# Patient Record
Sex: FEMALE | Race: WHITE | Employment: OTHER | URBAN - METROPOLITAN AREA
[De-identification: names, ages, dates, MRNs, and addresses within clinical notes are randomized per-mention and may not be internally consistent; named-entity substitution may affect disease eponyms.]

---

## 2018-07-16 ENCOUNTER — HOSPITAL ENCOUNTER (OUTPATIENT)
Dept: PREADMISSION TESTING | Age: 83
Discharge: HOME | End: 2018-07-16
Payer: MEDICARE

## 2018-07-16 DIAGNOSIS — I10 HYPERTENSION, UNSPECIFIED TYPE: Primary | ICD-10-CM

## 2019-03-15 ENCOUNTER — OFFICE VISIT (OUTPATIENT)
Dept: ORTHOPEDIC SURGERY | Age: 84
End: 2019-03-15
Payer: MEDICARE

## 2019-03-15 VITALS — BODY MASS INDEX: 27.31 KG/M2 | TEMPERATURE: 98 F | HEIGHT: 64 IN | WEIGHT: 160 LBS

## 2019-03-15 DIAGNOSIS — M17.11 PRIMARY OSTEOARTHRITIS OF RIGHT KNEE: Primary | ICD-10-CM

## 2019-03-15 PROCEDURE — 99203 OFFICE O/P NEW LOW 30 MIN: CPT | Performed by: ORTHOPAEDIC SURGERY

## 2019-03-15 RX ORDER — M-VIT,TX,IRON,MINS/CALC/FOLIC 27MG-0.4MG
1 TABLET ORAL DAILY
COMMUNITY

## 2019-03-27 ENCOUNTER — ANESTHESIA EVENT (OUTPATIENT)
Dept: OPERATING ROOM | Age: 84
DRG: 470 | End: 2019-03-27
Payer: MEDICARE

## 2019-03-27 ENCOUNTER — HOSPITAL ENCOUNTER (OUTPATIENT)
Dept: PREADMISSION TESTING | Age: 84
Discharge: HOME | End: 2019-03-27
Payer: MEDICARE

## 2019-03-27 VITALS
HEIGHT: 64 IN | BODY MASS INDEX: 27.3 KG/M2 | SYSTOLIC BLOOD PRESSURE: 128 MMHG | RESPIRATION RATE: 24 BRPM | HEART RATE: 91 BPM | WEIGHT: 159.9 LBS | DIASTOLIC BLOOD PRESSURE: 60 MMHG | OXYGEN SATURATION: 96 % | TEMPERATURE: 97.5 F

## 2019-03-27 DIAGNOSIS — Z01.818 PRE-OP TESTING: Primary | ICD-10-CM

## 2019-03-27 LAB
ABO/RH: NORMAL
ABO/RH: NORMAL
ANION GAP SERPL CALCULATED.3IONS-SCNC: 10 MMOL/L (ref 7–16)
ANTIBODY SCREEN: NORMAL
APTT: 31.1 SEC (ref 24.5–35.1)
BUN BLDV-MCNC: 22 MG/DL (ref 8–23)
CALCIUM SERPL-MCNC: 9.5 MG/DL (ref 8.6–10.2)
CHLORIDE BLD-SCNC: 105 MMOL/L (ref 98–107)
CO2: 31 MMOL/L (ref 22–29)
CREAT SERPL-MCNC: 1.1 MG/DL (ref 0.5–1)
EKG ATRIAL RATE: 78 BPM
EKG P AXIS: 48 DEGREES
EKG P-R INTERVAL: 182 MS
EKG Q-T INTERVAL: 358 MS
EKG QRS DURATION: 76 MS
EKG QTC CALCULATION (BAZETT): 408 MS
EKG R AXIS: 52 DEGREES
EKG T AXIS: 82 DEGREES
EKG VENTRICULAR RATE: 78 BPM
GFR AFRICAN AMERICAN: 56
GFR NON-AFRICAN AMERICAN: 47 ML/MIN/1.73
GLUCOSE BLD-MCNC: 98 MG/DL (ref 74–99)
HCT VFR BLD CALC: 39.3 % (ref 34–48)
HEMOGLOBIN: 12.5 G/DL (ref 11.5–15.5)
INR BLD: 1
MCH RBC QN AUTO: 29.8 PG (ref 26–35)
MCHC RBC AUTO-ENTMCNC: 31.8 % (ref 32–34.5)
MCV RBC AUTO: 93.8 FL (ref 80–99.9)
PDW BLD-RTO: 14.9 FL (ref 11.5–15)
PLATELET # BLD: 257 E9/L (ref 130–450)
PMV BLD AUTO: 10.7 FL (ref 7–12)
POTASSIUM REFLEX MAGNESIUM: 4.4 MMOL/L (ref 3.5–5)
PROTHROMBIN TIME: 11.4 SEC (ref 9.3–12.4)
RBC # BLD: 4.19 E12/L (ref 3.5–5.5)
SODIUM BLD-SCNC: 146 MMOL/L (ref 132–146)
WBC # BLD: 7.9 E9/L (ref 4.5–11.5)

## 2019-03-27 PROCEDURE — 85027 COMPLETE CBC AUTOMATED: CPT

## 2019-03-27 PROCEDURE — 86900 BLOOD TYPING SEROLOGIC ABO: CPT

## 2019-03-27 PROCEDURE — 85730 THROMBOPLASTIN TIME PARTIAL: CPT

## 2019-03-27 PROCEDURE — 86901 BLOOD TYPING SEROLOGIC RH(D): CPT

## 2019-03-27 PROCEDURE — 86850 RBC ANTIBODY SCREEN: CPT

## 2019-03-27 PROCEDURE — 85610 PROTHROMBIN TIME: CPT

## 2019-03-27 PROCEDURE — 87081 CULTURE SCREEN ONLY: CPT

## 2019-03-27 PROCEDURE — 80048 BASIC METABOLIC PNL TOTAL CA: CPT

## 2019-03-27 PROCEDURE — 36415 COLL VENOUS BLD VENIPUNCTURE: CPT

## 2019-03-27 PROCEDURE — 93005 ELECTROCARDIOGRAM TRACING: CPT | Performed by: ANESTHESIOLOGY

## 2019-03-27 RX ORDER — MIDAZOLAM HYDROCHLORIDE 1 MG/ML
1 INJECTION INTRAMUSCULAR; INTRAVENOUS
Status: CANCELLED | OUTPATIENT
Start: 2019-03-27 | End: 2019-03-27

## 2019-03-27 RX ORDER — SODIUM CHLORIDE 0.9 % (FLUSH) 0.9 %
10 SYRINGE (ML) INJECTION EVERY 12 HOURS SCHEDULED
Status: CANCELLED | OUTPATIENT
Start: 2019-03-27

## 2019-03-27 RX ORDER — FENTANYL CITRATE 50 UG/ML
25 INJECTION, SOLUTION INTRAMUSCULAR; INTRAVENOUS ONCE
Status: CANCELLED | OUTPATIENT
Start: 2019-03-27 | End: 2019-03-27

## 2019-03-27 RX ORDER — MIDAZOLAM HYDROCHLORIDE 1 MG/ML
1 INJECTION INTRAMUSCULAR; INTRAVENOUS EVERY 5 MIN PRN
Status: CANCELLED | OUTPATIENT
Start: 2019-03-27

## 2019-03-27 RX ORDER — ROPIVACAINE HYDROCHLORIDE 5 MG/ML
30 INJECTION, SOLUTION EPIDURAL; INFILTRATION; PERINEURAL ONCE
Status: CANCELLED | OUTPATIENT
Start: 2019-03-27 | End: 2019-03-27

## 2019-03-27 RX ORDER — CELECOXIB 100 MG/1
200 CAPSULE ORAL ONCE
Status: CANCELLED | OUTPATIENT
Start: 2019-03-27 | End: 2019-03-27

## 2019-03-27 RX ORDER — GABAPENTIN 100 MG/1
200 CAPSULE ORAL ONCE
Status: CANCELLED | OUTPATIENT
Start: 2019-03-27 | End: 2019-03-27

## 2019-03-27 RX ORDER — ACETAMINOPHEN 500 MG
1000 TABLET ORAL ONCE
Status: CANCELLED | OUTPATIENT
Start: 2019-03-27 | End: 2019-03-27

## 2019-03-27 RX ORDER — SODIUM CHLORIDE 0.9 % (FLUSH) 0.9 %
10 SYRINGE (ML) INJECTION PRN
Status: CANCELLED | OUTPATIENT
Start: 2019-03-27

## 2019-03-27 ASSESSMENT — PAIN - FUNCTIONAL ASSESSMENT: PAIN_FUNCTIONAL_ASSESSMENT: PREVENTS OR INTERFERES SOME ACTIVE ACTIVITIES AND ADLS

## 2019-03-27 ASSESSMENT — PAIN DESCRIPTION - PROGRESSION: CLINICAL_PROGRESSION: GRADUALLY WORSENING

## 2019-03-27 ASSESSMENT — PAIN DESCRIPTION - FREQUENCY: FREQUENCY: INTERMITTENT

## 2019-03-27 ASSESSMENT — PAIN DESCRIPTION - ORIENTATION: ORIENTATION: RIGHT

## 2019-03-27 ASSESSMENT — PAIN DESCRIPTION - PAIN TYPE: TYPE: CHRONIC PAIN

## 2019-03-27 ASSESSMENT — PAIN DESCRIPTION - LOCATION: LOCATION: KNEE

## 2019-03-27 ASSESSMENT — PAIN SCALES - GENERAL: PAINLEVEL_OUTOF10: 5

## 2019-03-27 NOTE — ANESTHESIA PRE PROCEDURE
Department of Anesthesiology  Preprocedure Note       Name:  Kourtney Diaz   Age:  80 y.o.  :  1929                                          MRN:  [id number]         Date:  2019      Surgeon: Tj Roman):  ISHMAEL Ruano,     Procedure: RIGHT KNEE TOTAL ARTHROPLASTY (WILLIAM) (Right )    Medications prior to admission:   Prior to Admission medications    Medication Sig Start Date End Date Taking? Authorizing Provider   aspirin 81 MG tablet Take 81 mg by mouth daily   Yes Historical Provider, MD   Nutritional Supplements (ENSURE ACTIVE HIGH PROTEIN PO) Take by mouth 2 times daily   Yes Historical Provider, MD   Multiple Vitamins-Minerals (THERAPEUTIC MULTIVITAMIN-MINERALS) tablet Take 1 tablet by mouth daily   Yes Historical Provider, MD   Multiple Vitamins-Minerals (OCUVITE ADULT FORMULA PO) Take by mouth daily   Yes Historical Provider, MD   omeprazole (PRILOSEC) 40 MG capsule Take 40 mg by mouth daily Takes every other day   Yes Historical Provider, MD   lisinopril (PRINIVIL;ZESTRIL) 5 MG tablet Take 5 mg by mouth daily. Yes Historical Provider, MD   simvastatin (ZOCOR) 20 MG tablet Take 20 mg by mouth nightly. Yes Historical Provider, MD   vitamin D (CHOLECALCIFEROL) 400 UNIT TABS tablet Take 1,000 Units by mouth daily. Yes Historical Provider, MD   vitamin B-12 (CYANOCOBALAMIN) 1000 MCG tablet Take 1,000 mcg by mouth daily.      Yes Historical Provider, MD   Naproxen Sodium 220 MG CAPS Take 1 tablet by mouth as needed for Pain Hold 5 days    Historical Provider, MD       Current medications:    Current Facility-Administered Medications   Medication Dose Route Frequency Provider Last Rate Last Dose    ceFAZolin (ANCEF) 2 g in dextrose 3 % 50 mL IVPB (duplex)  2 g Intravenous On Call to [address], DO        tranexamic acid (CYKLOKAPRON) irrigation 1,000 mg  1,000 mg Intra-articular On Call to [address], DO        0.9 % sodium chloride infusion   Intravenous Continuous

## 2019-03-29 LAB — MRSA CULTURE ONLY: NORMAL

## 2019-04-05 NOTE — FLOWSHEET NOTE
04/05/19 1553   Social/Functional History   Lives With Spouse   Type of [address] One level   Home Access Stairs to enter with rails   Entrance Stairs - Number of Steps 4 steps into home- 1 rail   Entrance Stairs - Rails Right   Bathroom Shower/Tub Tub/Shower unit   Bathroom Equipment Shower chair   Home Equipment Cane;Rolling walker   Met with pt in PAT, pt having surgery for a total knee arthroplasty on 4/9 , explained sw role and reviewed rehab options and providers for [employer], pt lives with her  and plans to return home, pt relays past home care, unsure of agency and no SNF placements,lists offered, pt receptive to [facility] for home nursing and therapy and bsc, pt uses [facility] [address], on [address] in [facility], referrals made to Willy Rodriguez at [facility] and to [facility] Lam, will follow post-op for orders. sw will follow.  Electronically signed by ANNIA Hunt on 4/5/2019 at 3:47 PM

## 2019-04-08 NOTE — PROGRESS NOTES
Patient and spouse attended preoperative [facility] [address] on 4/8/2019. Patient is scheduled to have an elective knee replacement. Patient was educated regarding Disease Process, Medications, Smoking Cessation, Oxygenation, Incentive Spirometry and Deep Breath and Cough, signs and symptoms of postoperative joint infection that include: Fever, Chills, Pain Control, Drainage and Redness, post-op follow up with orthopaedic surgeon, dressing removal, steri strips, ambulatory devices which include a standard walker and cane, bed mobility, correct anatomical alignment, active range of motion, proper transferring technique, incision care, infection prevention measures, non-pharmacologic comfort measures, notification of inadequate pain control measures, pain scale for assessing level of pain, pharmacologic pain management, relaxation techniques. [address] also included patient and family watching an educational total knee replacement video and visual examples of mobility training postoperative by a physical therapist and orthopaedic navigator.

## 2019-04-09 ENCOUNTER — HOSPITAL ENCOUNTER (INPATIENT)
Age: 84
LOS: 1 days | Discharge: HOME HEALTH | DRG: 470 | End: 2019-04-10
Attending: ORTHOPAEDIC SURGERY | Admitting: ORTHOPAEDIC SURGERY
Payer: MEDICARE

## 2019-04-09 ENCOUNTER — APPOINTMENT (OUTPATIENT)
Dept: GENERAL RADIOLOGY | Age: 84
DRG: 470 | End: 2019-04-09
Attending: ORTHOPAEDIC SURGERY
Payer: MEDICARE

## 2019-04-09 ENCOUNTER — ANESTHESIA (OUTPATIENT)
Dept: OPERATING ROOM | Age: 84
DRG: 470 | End: 2019-04-09
Payer: MEDICARE

## 2019-04-09 VITALS
RESPIRATION RATE: 1 BRPM | OXYGEN SATURATION: 96 % | SYSTOLIC BLOOD PRESSURE: 146 MMHG | TEMPERATURE: 98.6 F | DIASTOLIC BLOOD PRESSURE: 64 MMHG

## 2019-04-09 DIAGNOSIS — M17.11 ARTHRITIS OF RIGHT KNEE: Primary | ICD-10-CM

## 2019-04-09 DIAGNOSIS — Z98.890 HISTORY OF SURGERY: ICD-10-CM

## 2019-04-09 PROBLEM — M17.10 ARTHRITIS OF KNEE: Status: ACTIVE | Noted: 2019-04-09

## 2019-04-09 PROCEDURE — 6360000002 HC RX W HCPCS: Performed by: ANESTHESIOLOGY

## 2019-04-09 PROCEDURE — 88311 DECALCIFY TISSUE: CPT

## 2019-04-09 PROCEDURE — 97165 OT EVAL LOW COMPLEX 30 MIN: CPT

## 2019-04-09 PROCEDURE — 2709999900 HC NON-CHARGEABLE SUPPLY: Performed by: ORTHOPAEDIC SURGERY

## 2019-04-09 PROCEDURE — 3700000000 HC ANESTHESIA ATTENDED CARE: Performed by: ORTHOPAEDIC SURGERY

## 2019-04-09 PROCEDURE — C1713 ANCHOR/SCREW BN/BN,TIS/BN: HCPCS | Performed by: ORTHOPAEDIC SURGERY

## 2019-04-09 PROCEDURE — 97530 THERAPEUTIC ACTIVITIES: CPT

## 2019-04-09 PROCEDURE — 7100000000 HC PACU RECOVERY - FIRST 15 MIN: Performed by: ORTHOPAEDIC SURGERY

## 2019-04-09 PROCEDURE — 88305 TISSUE EXAM BY PATHOLOGIST: CPT

## 2019-04-09 PROCEDURE — 73560 X-RAY EXAM OF KNEE 1 OR 2: CPT

## 2019-04-09 PROCEDURE — 2580000003 HC RX 258: Performed by: NURSE ANESTHETIST, CERTIFIED REGISTERED

## 2019-04-09 PROCEDURE — 3600000015 HC SURGERY LEVEL 5 ADDTL 15MIN: Performed by: ORTHOPAEDIC SURGERY

## 2019-04-09 PROCEDURE — 2580000003 HC RX 258: Performed by: ORTHOPAEDIC SURGERY

## 2019-04-09 PROCEDURE — 2500000003 HC RX 250 WO HCPCS: Performed by: NURSE ANESTHETIST, CERTIFIED REGISTERED

## 2019-04-09 PROCEDURE — 64447 NJX AA&/STRD FEMORAL NRV IMG: CPT | Performed by: ANESTHESIOLOGY

## 2019-04-09 PROCEDURE — 2500000003 HC RX 250 WO HCPCS: Performed by: ORTHOPAEDIC SURGERY

## 2019-04-09 PROCEDURE — 3600000005 HC SURGERY LEVEL 5 BASE: Performed by: ORTHOPAEDIC SURGERY

## 2019-04-09 PROCEDURE — 6360000002 HC RX W HCPCS

## 2019-04-09 PROCEDURE — 7100000001 HC PACU RECOVERY - ADDTL 15 MIN: Performed by: ORTHOPAEDIC SURGERY

## 2019-04-09 PROCEDURE — 6360000002 HC RX W HCPCS: Performed by: NURSE ANESTHETIST, CERTIFIED REGISTERED

## 2019-04-09 PROCEDURE — 6360000002 HC RX W HCPCS: Performed by: ORTHOPAEDIC SURGERY

## 2019-04-09 PROCEDURE — 97110 THERAPEUTIC EXERCISES: CPT | Performed by: PHYSICAL THERAPIST

## 2019-04-09 PROCEDURE — 6370000000 HC RX 637 (ALT 250 FOR IP): Performed by: ANESTHESIOLOGY

## 2019-04-09 PROCEDURE — 97530 THERAPEUTIC ACTIVITIES: CPT | Performed by: PHYSICAL THERAPIST

## 2019-04-09 PROCEDURE — 97161 PT EVAL LOW COMPLEX 20 MIN: CPT | Performed by: PHYSICAL THERAPIST

## 2019-04-09 PROCEDURE — 3700000001 HC ADD 15 MINUTES (ANESTHESIA): Performed by: ORTHOPAEDIC SURGERY

## 2019-04-09 PROCEDURE — C1776 JOINT DEVICE (IMPLANTABLE): HCPCS | Performed by: ORTHOPAEDIC SURGERY

## 2019-04-09 PROCEDURE — 1200000000 HC SEMI PRIVATE

## 2019-04-09 PROCEDURE — 0SRC0J9 REPLACEMENT OF RIGHT KNEE JOINT WITH SYNTHETIC SUBSTITUTE, CEMENTED, OPEN APPROACH: ICD-10-PCS | Performed by: ORTHOPAEDIC SURGERY

## 2019-04-09 PROCEDURE — 6370000000 HC RX 637 (ALT 250 FOR IP): Performed by: ORTHOPAEDIC SURGERY

## 2019-04-09 DEVICE — COMPONENT FEM SZ 4 R KNEE POST STBL CEM TRIATHLON: Type: IMPLANTABLE DEVICE | Site: KNEE | Status: FUNCTIONAL

## 2019-04-09 DEVICE — INSERT TIB BEAR SZ 4 THK13MM KNEE X3 POST STBL TRIATHLON: Type: IMPLANTABLE DEVICE | Site: KNEE | Status: FUNCTIONAL

## 2019-04-09 DEVICE — IMPLANT PAT DIA29MM THK8MM X3 SYMMETRIC TRIATHLON: Type: IMPLANTABLE DEVICE | Site: KNEE | Status: FUNCTIONAL

## 2019-04-09 DEVICE — COMPONENT TOT KNEE CAPPED ADV STRYKNEEA] STRYKER CORP]: Type: IMPLANTABLE DEVICE | Status: FUNCTIONAL

## 2019-04-09 DEVICE — BASEPLATE TIB SZ 4 KNEE TRITANIUM CEM PRI LO PROF TRIATHLON: Type: IMPLANTABLE DEVICE | Site: KNEE | Status: FUNCTIONAL

## 2019-04-09 DEVICE — CEMENT BNE 20ML 40GM FULL DOSE PMMA W/O ANTIBIO M VISC: Type: IMPLANTABLE DEVICE | Site: KNEE | Status: FUNCTIONAL

## 2019-04-09 DEVICE — COMPONENT PATELLAR KNEE X3: Type: IMPLANTABLE DEVICE | Status: FUNCTIONAL

## 2019-04-09 RX ORDER — HYDROCODONE BITARTRATE AND ACETAMINOPHEN 10; 325 MG/1; MG/1
1 TABLET ORAL EVERY 6 HOURS PRN
Qty: 28 TABLET | Refills: 0 | Status: SHIPPED | OUTPATIENT
Start: 2019-04-09 | End: 2019-04-16

## 2019-04-09 RX ORDER — ONDANSETRON 2 MG/ML
INJECTION INTRAMUSCULAR; INTRAVENOUS PRN
Status: DISCONTINUED | OUTPATIENT
Start: 2019-04-09 | End: 2019-04-09 | Stop reason: SDUPTHER

## 2019-04-09 RX ORDER — CEFAZOLIN SODIUM 2 G/50ML
SOLUTION INTRAVENOUS
Status: COMPLETED
Start: 2019-04-09 | End: 2019-04-09

## 2019-04-09 RX ORDER — LISINOPRIL 5 MG/1
5 TABLET ORAL DAILY
Status: DISCONTINUED | OUTPATIENT
Start: 2019-04-09 | End: 2019-04-10 | Stop reason: HOSPADM

## 2019-04-09 RX ORDER — MIDAZOLAM HYDROCHLORIDE 1 MG/ML
1 INJECTION INTRAMUSCULAR; INTRAVENOUS
Status: DISCONTINUED | OUTPATIENT
Start: 2019-04-09 | End: 2019-04-09 | Stop reason: HOSPADM

## 2019-04-09 RX ORDER — FENTANYL CITRATE 50 UG/ML
25 INJECTION, SOLUTION INTRAMUSCULAR; INTRAVENOUS ONCE
Status: DISCONTINUED | OUTPATIENT
Start: 2019-04-09 | End: 2019-04-09 | Stop reason: HOSPADM

## 2019-04-09 RX ORDER — GABAPENTIN 100 MG/1
200 CAPSULE ORAL ONCE
Status: COMPLETED | OUTPATIENT
Start: 2019-04-09 | End: 2019-04-09

## 2019-04-09 RX ORDER — PANTOPRAZOLE SODIUM 40 MG/1
40 TABLET, DELAYED RELEASE ORAL
Status: DISCONTINUED | OUTPATIENT
Start: 2019-04-10 | End: 2019-04-10 | Stop reason: HOSPADM

## 2019-04-09 RX ORDER — MIDAZOLAM HYDROCHLORIDE 1 MG/ML
INJECTION INTRAMUSCULAR; INTRAVENOUS
Status: DISCONTINUED
Start: 2019-04-09 | End: 2019-04-09

## 2019-04-09 RX ORDER — M-VIT,TX,IRON,MINS/CALC/FOLIC 27MG-0.4MG
1 TABLET ORAL DAILY
Status: DISCONTINUED | OUTPATIENT
Start: 2019-04-09 | End: 2019-04-10 | Stop reason: HOSPADM

## 2019-04-09 RX ORDER — CEFAZOLIN SODIUM 2 G/50ML
2 SOLUTION INTRAVENOUS EVERY 8 HOURS
Status: DISCONTINUED | OUTPATIENT
Start: 2019-04-09 | End: 2019-04-09 | Stop reason: SDUPTHER

## 2019-04-09 RX ORDER — SODIUM CHLORIDE 0.9 % (FLUSH) 0.9 %
10 SYRINGE (ML) INJECTION EVERY 12 HOURS SCHEDULED
Status: DISCONTINUED | OUTPATIENT
Start: 2019-04-09 | End: 2019-04-09 | Stop reason: SDUPTHER

## 2019-04-09 RX ORDER — ROPIVACAINE HYDROCHLORIDE 5 MG/ML
30 INJECTION, SOLUTION EPIDURAL; INFILTRATION; PERINEURAL ONCE
Status: COMPLETED | OUTPATIENT
Start: 2019-04-09 | End: 2019-04-09

## 2019-04-09 RX ORDER — ASPIRIN 325 MG
325 TABLET, DELAYED RELEASE (ENTERIC COATED) ORAL DAILY
Qty: 56 TABLET | Refills: 0 | Status: SHIPPED | OUTPATIENT
Start: 2019-04-09 | End: 2019-05-17

## 2019-04-09 RX ORDER — CELECOXIB 100 MG/1
200 CAPSULE ORAL ONCE
Status: COMPLETED | OUTPATIENT
Start: 2019-04-09 | End: 2019-04-09

## 2019-04-09 RX ORDER — MIDAZOLAM HYDROCHLORIDE 1 MG/ML
INJECTION INTRAMUSCULAR; INTRAVENOUS PRN
Status: DISCONTINUED | OUTPATIENT
Start: 2019-04-09 | End: 2019-04-09 | Stop reason: SDUPTHER

## 2019-04-09 RX ORDER — MORPHINE SULFATE 4 MG/ML
4 INJECTION, SOLUTION INTRAMUSCULAR; INTRAVENOUS
Status: DISCONTINUED | OUTPATIENT
Start: 2019-04-09 | End: 2019-04-09 | Stop reason: CLARIF

## 2019-04-09 RX ORDER — LANOLIN ALCOHOL/MO/W.PET/CERES
1000 CREAM (GRAM) TOPICAL DAILY
Status: DISCONTINUED | OUTPATIENT
Start: 2019-04-09 | End: 2019-04-10 | Stop reason: HOSPADM

## 2019-04-09 RX ORDER — SODIUM CHLORIDE, SODIUM LACTATE, POTASSIUM CHLORIDE, CALCIUM CHLORIDE 600; 310; 30; 20 MG/100ML; MG/100ML; MG/100ML; MG/100ML
INJECTION, SOLUTION INTRAVENOUS CONTINUOUS PRN
Status: DISCONTINUED | OUTPATIENT
Start: 2019-04-09 | End: 2019-04-09 | Stop reason: SDUPTHER

## 2019-04-09 RX ORDER — METOPROLOL TARTRATE 5 MG/5ML
INJECTION INTRAVENOUS PRN
Status: DISCONTINUED | OUTPATIENT
Start: 2019-04-09 | End: 2019-04-09 | Stop reason: SDUPTHER

## 2019-04-09 RX ORDER — BUPIVACAINE HYDROCHLORIDE 2.5 MG/ML
INJECTION, SOLUTION EPIDURAL; INFILTRATION; INTRACAUDAL PRN
Status: DISCONTINUED | OUTPATIENT
Start: 2019-04-09 | End: 2019-04-09 | Stop reason: ALTCHOICE

## 2019-04-09 RX ORDER — SODIUM CHLORIDE 0.9 % (FLUSH) 0.9 %
10 SYRINGE (ML) INJECTION EVERY 12 HOURS SCHEDULED
Status: DISCONTINUED | OUTPATIENT
Start: 2019-04-09 | End: 2019-04-09 | Stop reason: HOSPADM

## 2019-04-09 RX ORDER — SODIUM CHLORIDE 450 MG/100ML
INJECTION, SOLUTION INTRAVENOUS CONTINUOUS
Status: DISCONTINUED | OUTPATIENT
Start: 2019-04-09 | End: 2019-04-10 | Stop reason: HOSPADM

## 2019-04-09 RX ORDER — MORPHINE SULFATE 2 MG/ML
2 INJECTION, SOLUTION INTRAMUSCULAR; INTRAVENOUS
Status: DISCONTINUED | OUTPATIENT
Start: 2019-04-09 | End: 2019-04-10 | Stop reason: HOSPADM

## 2019-04-09 RX ORDER — SODIUM CHLORIDE 0.9 % (FLUSH) 0.9 %
10 SYRINGE (ML) INJECTION PRN
Status: DISCONTINUED | OUTPATIENT
Start: 2019-04-09 | End: 2019-04-10 | Stop reason: HOSPADM

## 2019-04-09 RX ORDER — ROPIVACAINE HYDROCHLORIDE 5 MG/ML
INJECTION, SOLUTION EPIDURAL; INFILTRATION; PERINEURAL
Status: COMPLETED | OUTPATIENT
Start: 2019-04-09 | End: 2019-04-09

## 2019-04-09 RX ORDER — FENTANYL CITRATE 50 UG/ML
INJECTION, SOLUTION INTRAMUSCULAR; INTRAVENOUS PRN
Status: DISCONTINUED | OUTPATIENT
Start: 2019-04-09 | End: 2019-04-09 | Stop reason: SDUPTHER

## 2019-04-09 RX ORDER — ROPIVACAINE HYDROCHLORIDE 5 MG/ML
INJECTION, SOLUTION EPIDURAL; INFILTRATION; PERINEURAL
Status: COMPLETED
Start: 2019-04-09 | End: 2019-04-09

## 2019-04-09 RX ORDER — CEFAZOLIN SODIUM 2 G/50ML
2 SOLUTION INTRAVENOUS EVERY 8 HOURS
Status: COMPLETED | OUTPATIENT
Start: 2019-04-09 | End: 2019-04-09

## 2019-04-09 RX ORDER — ASPIRIN 81 MG/1
81 TABLET ORAL DAILY
Status: DISCONTINUED | OUTPATIENT
Start: 2019-04-09 | End: 2019-04-10 | Stop reason: HOSPADM

## 2019-04-09 RX ORDER — FENTANYL CITRATE 50 UG/ML
INJECTION, SOLUTION INTRAMUSCULAR; INTRAVENOUS
Status: COMPLETED
Start: 2019-04-09 | End: 2019-04-09

## 2019-04-09 RX ORDER — SODIUM CHLORIDE 0.9 % (FLUSH) 0.9 %
10 SYRINGE (ML) INJECTION PRN
Status: DISCONTINUED | OUTPATIENT
Start: 2019-04-09 | End: 2019-04-09 | Stop reason: SDUPTHER

## 2019-04-09 RX ORDER — FENTANYL CITRATE 50 UG/ML
50 INJECTION, SOLUTION INTRAMUSCULAR; INTRAVENOUS EVERY 5 MIN PRN
Status: COMPLETED | OUTPATIENT
Start: 2019-04-09 | End: 2019-04-09

## 2019-04-09 RX ORDER — SODIUM CHLORIDE 9 MG/ML
INJECTION, SOLUTION INTRAVENOUS CONTINUOUS
Status: DISCONTINUED | OUTPATIENT
Start: 2019-04-09 | End: 2019-04-09

## 2019-04-09 RX ORDER — SODIUM CHLORIDE 0.9 % (FLUSH) 0.9 %
10 SYRINGE (ML) INJECTION EVERY 12 HOURS SCHEDULED
Status: DISCONTINUED | OUTPATIENT
Start: 2019-04-09 | End: 2019-04-10 | Stop reason: HOSPADM

## 2019-04-09 RX ORDER — SODIUM CHLORIDE 0.9 % (FLUSH) 0.9 %
10 SYRINGE (ML) INJECTION PRN
Status: DISCONTINUED | OUTPATIENT
Start: 2019-04-09 | End: 2019-04-09 | Stop reason: HOSPADM

## 2019-04-09 RX ORDER — ONDANSETRON 2 MG/ML
4 INJECTION INTRAMUSCULAR; INTRAVENOUS EVERY 6 HOURS PRN
Status: DISCONTINUED | OUTPATIENT
Start: 2019-04-09 | End: 2019-04-10 | Stop reason: HOSPADM

## 2019-04-09 RX ORDER — MORPHINE SULFATE 4 MG/ML
4 INJECTION, SOLUTION INTRAMUSCULAR; INTRAVENOUS
Status: DISCONTINUED | OUTPATIENT
Start: 2019-04-09 | End: 2019-04-10 | Stop reason: HOSPADM

## 2019-04-09 RX ORDER — VIT C/VIT E/LUTEIN/MIN/OMEGA-3 100-15-2
1 CAPSULE ORAL DAILY
Status: DISCONTINUED | OUTPATIENT
Start: 2019-04-09 | End: 2019-04-09 | Stop reason: CLARIF

## 2019-04-09 RX ORDER — HYDROCODONE BITARTRATE AND ACETAMINOPHEN 5; 325 MG/1; MG/1
1 TABLET ORAL EVERY 4 HOURS PRN
Status: DISCONTINUED | OUTPATIENT
Start: 2019-04-09 | End: 2019-04-10 | Stop reason: HOSPADM

## 2019-04-09 RX ORDER — DOCUSATE SODIUM 100 MG/1
100 CAPSULE, LIQUID FILLED ORAL 2 TIMES DAILY
Status: DISCONTINUED | OUTPATIENT
Start: 2019-04-09 | End: 2019-04-10 | Stop reason: HOSPADM

## 2019-04-09 RX ORDER — MIDAZOLAM HYDROCHLORIDE 1 MG/ML
1 INJECTION INTRAMUSCULAR; INTRAVENOUS EVERY 5 MIN PRN
Status: DISCONTINUED | OUTPATIENT
Start: 2019-04-09 | End: 2019-04-09 | Stop reason: HOSPADM

## 2019-04-09 RX ORDER — PROPOFOL 10 MG/ML
INJECTION, EMULSION INTRAVENOUS PRN
Status: DISCONTINUED | OUTPATIENT
Start: 2019-04-09 | End: 2019-04-09 | Stop reason: SDUPTHER

## 2019-04-09 RX ORDER — SIMVASTATIN 20 MG
20 TABLET ORAL NIGHTLY
Status: DISCONTINUED | OUTPATIENT
Start: 2019-04-09 | End: 2019-04-10 | Stop reason: HOSPADM

## 2019-04-09 RX ORDER — ACETAMINOPHEN 500 MG
1000 TABLET ORAL ONCE
Status: COMPLETED | OUTPATIENT
Start: 2019-04-09 | End: 2019-04-09

## 2019-04-09 RX ORDER — DEXAMETHASONE SODIUM PHOSPHATE 10 MG/ML
INJECTION, SOLUTION INTRAMUSCULAR; INTRAVENOUS PRN
Status: DISCONTINUED | OUTPATIENT
Start: 2019-04-09 | End: 2019-04-09 | Stop reason: SDUPTHER

## 2019-04-09 RX ORDER — HYDROCODONE BITARTRATE AND ACETAMINOPHEN 5; 325 MG/1; MG/1
2 TABLET ORAL EVERY 4 HOURS PRN
Status: DISCONTINUED | OUTPATIENT
Start: 2019-04-09 | End: 2019-04-10 | Stop reason: HOSPADM

## 2019-04-09 RX ORDER — CEFAZOLIN SODIUM 2 G/50ML
2 SOLUTION INTRAVENOUS
Status: COMPLETED | OUTPATIENT
Start: 2019-04-09 | End: 2019-04-09

## 2019-04-09 RX ADMIN — SIMVASTATIN 20 MG: 20 TABLET, FILM COATED ORAL at 21:27

## 2019-04-09 RX ADMIN — ROPIVACAINE HYDROCHLORIDE 30 ML: 5 INJECTION, SOLUTION EPIDURAL; INFILTRATION; PERINEURAL at 09:20

## 2019-04-09 RX ADMIN — FENTANYL CITRATE 25 MCG: 50 INJECTION, SOLUTION INTRAMUSCULAR; INTRAVENOUS at 11:27

## 2019-04-09 RX ADMIN — PROPOFOL 50 MG: 10 INJECTION, EMULSION INTRAVENOUS at 10:33

## 2019-04-09 RX ADMIN — DOCUSATE SODIUM 100 MG: 100 CAPSULE ORAL at 21:27

## 2019-04-09 RX ADMIN — HYDROCODONE BITARTRATE AND ACETAMINOPHEN 2 TABLET: 5; 325 TABLET ORAL at 19:50

## 2019-04-09 RX ADMIN — FENTANYL CITRATE 50 MCG: 50 INJECTION INTRAMUSCULAR; INTRAVENOUS at 09:16

## 2019-04-09 RX ADMIN — VITAMIN D, TAB 1000IU (100/BT) 1000 UNITS: 25 TAB at 18:18

## 2019-04-09 RX ADMIN — SODIUM CHLORIDE, POTASSIUM CHLORIDE, SODIUM LACTATE AND CALCIUM CHLORIDE: 600; 310; 30; 20 INJECTION, SOLUTION INTRAVENOUS at 10:31

## 2019-04-09 RX ADMIN — LISINOPRIL 5 MG: 5 TABLET ORAL at 15:19

## 2019-04-09 RX ADMIN — FENTANYL CITRATE 50 MCG: 50 INJECTION INTRAMUSCULAR; INTRAVENOUS at 09:13

## 2019-04-09 RX ADMIN — CELECOXIB 200 MG: 100 CAPSULE ORAL at 08:25

## 2019-04-09 RX ADMIN — METOPROLOL TARTRATE 1.5 MG: 5 INJECTION, SOLUTION INTRAVENOUS at 11:12

## 2019-04-09 RX ADMIN — MIDAZOLAM 2 MG: 1 INJECTION INTRAMUSCULAR; INTRAVENOUS at 10:30

## 2019-04-09 RX ADMIN — PROPOFOL 25 MG: 10 INJECTION, EMULSION INTRAVENOUS at 10:58

## 2019-04-09 RX ADMIN — ACETAMINOPHEN 1000 MG: 500 TABLET, COATED ORAL at 08:24

## 2019-04-09 RX ADMIN — MULTIPLE VITAMINS W/ MINERALS TAB 1 TABLET: TAB at 18:18

## 2019-04-09 RX ADMIN — CEFAZOLIN SODIUM 2 G: 2 SOLUTION INTRAVENOUS at 22:33

## 2019-04-09 RX ADMIN — FENTANYL CITRATE 50 MCG: 50 INJECTION, SOLUTION INTRAMUSCULAR; INTRAVENOUS at 12:34

## 2019-04-09 RX ADMIN — METOPROLOL TARTRATE 1 MG: 5 INJECTION, SOLUTION INTRAVENOUS at 10:52

## 2019-04-09 RX ADMIN — CYANOCOBALAMIN TAB 1000 MCG 1000 MCG: 1000 TAB at 18:18

## 2019-04-09 RX ADMIN — FENTANYL CITRATE 25 MCG: 50 INJECTION, SOLUTION INTRAMUSCULAR; INTRAVENOUS at 12:09

## 2019-04-09 RX ADMIN — CEFAZOLIN SODIUM 2 G: 2 SOLUTION INTRAVENOUS at 13:54

## 2019-04-09 RX ADMIN — PROPOFOL 25 MG: 10 INJECTION, EMULSION INTRAVENOUS at 10:45

## 2019-04-09 RX ADMIN — FENTANYL CITRATE 50 MCG: 50 INJECTION, SOLUTION INTRAMUSCULAR; INTRAVENOUS at 10:32

## 2019-04-09 RX ADMIN — GABAPENTIN 200 MG: 100 CAPSULE ORAL at 08:25

## 2019-04-09 RX ADMIN — CEFAZOLIN SODIUM 2 G: 2 SOLUTION INTRAVENOUS at 10:47

## 2019-04-09 RX ADMIN — DEXAMETHASONE SODIUM PHOSPHATE 10 MG: 10 INJECTION, SOLUTION INTRAMUSCULAR; INTRAVENOUS at 11:53

## 2019-04-09 RX ADMIN — ONDANSETRON HYDROCHLORIDE 4 MG: 2 INJECTION, SOLUTION INTRAMUSCULAR; INTRAVENOUS at 11:53

## 2019-04-09 RX ADMIN — ROPIVACAINE HYDROCHLORIDE 30 ML: 5 INJECTION, SOLUTION EPIDURAL; INFILTRATION; PERINEURAL at 09:27

## 2019-04-09 RX ADMIN — SODIUM CHLORIDE: 4.5 INJECTION, SOLUTION INTRAVENOUS at 18:18

## 2019-04-09 RX ADMIN — METOPROLOL TARTRATE 2.5 MG: 5 INJECTION, SOLUTION INTRAVENOUS at 12:41

## 2019-04-09 RX ADMIN — FENTANYL CITRATE 50 MCG: 50 INJECTION, SOLUTION INTRAMUSCULAR; INTRAVENOUS at 10:53

## 2019-04-09 ASSESSMENT — PULMONARY FUNCTION TESTS
PIF_VALUE: 2
PIF_VALUE: 17
PIF_VALUE: 2
PIF_VALUE: 4
PIF_VALUE: 2
PIF_VALUE: 1
PIF_VALUE: 3
PIF_VALUE: 10
PIF_VALUE: 2
PIF_VALUE: 14
PIF_VALUE: 0
PIF_VALUE: 2
PIF_VALUE: 2
PIF_VALUE: 17
PIF_VALUE: 2
PIF_VALUE: 2
PIF_VALUE: 23
PIF_VALUE: 17
PIF_VALUE: 3
PIF_VALUE: 2
PIF_VALUE: 1
PIF_VALUE: 4
PIF_VALUE: 2
PIF_VALUE: 3
PIF_VALUE: 5
PIF_VALUE: 3
PIF_VALUE: 2
PIF_VALUE: 1
PIF_VALUE: 2
PIF_VALUE: 3
PIF_VALUE: 3
PIF_VALUE: 2
PIF_VALUE: 2
PIF_VALUE: 3
PIF_VALUE: 2
PIF_VALUE: 2
PIF_VALUE: 3
PIF_VALUE: 2
PIF_VALUE: 4
PIF_VALUE: 2
PIF_VALUE: 3
PIF_VALUE: 2
PIF_VALUE: 3
PIF_VALUE: 2
PIF_VALUE: 1
PIF_VALUE: 3
PIF_VALUE: 1
PIF_VALUE: 2
PIF_VALUE: 3
PIF_VALUE: 2
PIF_VALUE: 2
PIF_VALUE: 3
PIF_VALUE: 3
PIF_VALUE: 2
PIF_VALUE: 3
PIF_VALUE: 21
PIF_VALUE: 2
PIF_VALUE: 3
PIF_VALUE: 2
PIF_VALUE: 3
PIF_VALUE: 2
PIF_VALUE: 3
PIF_VALUE: 5
PIF_VALUE: 5
PIF_VALUE: 3
PIF_VALUE: 1
PIF_VALUE: 0
PIF_VALUE: 2
PIF_VALUE: 11
PIF_VALUE: 2
PIF_VALUE: 2
PIF_VALUE: 3
PIF_VALUE: 16
PIF_VALUE: 0
PIF_VALUE: 2
PIF_VALUE: 2
PIF_VALUE: 16
PIF_VALUE: 3
PIF_VALUE: 2
PIF_VALUE: 1
PIF_VALUE: 2
PIF_VALUE: 0
PIF_VALUE: 1
PIF_VALUE: 2
PIF_VALUE: 1
PIF_VALUE: 2
PIF_VALUE: 2
PIF_VALUE: 5
PIF_VALUE: 18
PIF_VALUE: 2
PIF_VALUE: 2
PIF_VALUE: 1
PIF_VALUE: 3
PIF_VALUE: 2
PIF_VALUE: 2
PIF_VALUE: 0
PIF_VALUE: 3
PIF_VALUE: 3
PIF_VALUE: 2
PIF_VALUE: 17
PIF_VALUE: 2
PIF_VALUE: 2
PIF_VALUE: 1
PIF_VALUE: 2
PIF_VALUE: 1
PIF_VALUE: 3
PIF_VALUE: 2
PIF_VALUE: 2
PIF_VALUE: 1
PIF_VALUE: 2
PIF_VALUE: 3
PIF_VALUE: 1
PIF_VALUE: 3
PIF_VALUE: 17
PIF_VALUE: 14
PIF_VALUE: 2
PIF_VALUE: 3
PIF_VALUE: 2
PIF_VALUE: 2
PIF_VALUE: 10
PIF_VALUE: 6
PIF_VALUE: 4
PIF_VALUE: 3
PIF_VALUE: 2
PIF_VALUE: 1
PIF_VALUE: 2
PIF_VALUE: 7
PIF_VALUE: 4

## 2019-04-09 ASSESSMENT — PAIN DESCRIPTION - LOCATION: LOCATION: KNEE

## 2019-04-09 ASSESSMENT — PAIN SCALES - GENERAL
PAINLEVEL_OUTOF10: 6
PAINLEVEL_OUTOF10: 0
PAINLEVEL_OUTOF10: 6
PAINLEVEL_OUTOF10: 0
PAINLEVEL_OUTOF10: 9
PAINLEVEL_OUTOF10: 0

## 2019-04-09 ASSESSMENT — PAIN DESCRIPTION - ORIENTATION: ORIENTATION: RIGHT

## 2019-04-09 ASSESSMENT — PAIN DESCRIPTION - PROGRESSION: CLINICAL_PROGRESSION: GRADUALLY WORSENING

## 2019-04-09 ASSESSMENT — PAIN DESCRIPTION - PAIN TYPE: TYPE: SURGICAL PAIN

## 2019-04-09 ASSESSMENT — PAIN DESCRIPTION - DESCRIPTORS: DESCRIPTORS: ACHING;DISCOMFORT;DULL

## 2019-04-09 ASSESSMENT — PAIN DESCRIPTION - ONSET: ONSET: ON-GOING

## 2019-04-09 ASSESSMENT — PAIN DESCRIPTION - FREQUENCY: FREQUENCY: INTERMITTENT

## 2019-04-09 ASSESSMENT — PAIN - FUNCTIONAL ASSESSMENT
PAIN_FUNCTIONAL_ASSESSMENT: 0-10
PAIN_FUNCTIONAL_ASSESSMENT: PREVENTS OR INTERFERES SOME ACTIVE ACTIVITIES AND ADLS

## 2019-04-09 NOTE — PROGRESS NOTES
including review of medical records relating to the problem  · Exam: 1-3 performance Deficits  · Assistance/Modification: No assistance or modifications required to perform tasks. No comorbities affecting occupational performance. Patients Goal: return home   Treatment: OT eval, pt educated and trained in weight bearing status and dressing kit, pt educated in use of leg  prior to use, bed mobility with assistance to guide lower extremity off bed using leg , sitting balance at EOB for 15 minutes with supervision, transfer training with verbal cues for hand placement, static standing balance with wheeled walker x 2 reps, functional mobility with wheeled walker, verbal cues for walker sequence and safety, assistance needed for R knee extension, pt returned to bed at end of eval. Maximal assist x 2 to scoot up in bed. Education provided for proper positioning of lower extremity in bed to prevent contractures. All needs within reach. Rehab Potential: good   Plan of care: Patient will be seen by OT 2-4 times a week for therapeutic activity, ADL re-training, bed mobility, functional transfers, functional mobility, safety and fall prevention, balance and endurance activities, instruction in energy conservation principles, and patient/family education. Patient and/or family understands diagnosis, prognosis, education and plan of care. Pt/family verbalized understanding.      Time Code treatment minutes: Alirio OTR/L #[id number]

## 2019-04-09 NOTE — PROGRESS NOTES
Physical Therapy    0320/0320-01    PHYSICAL THERAPY INITIAL EVALUATION  Tentative placement recommendation:   Home with home p.t. Equipment prescriptions needed:  Elevated toilet seat preferred,     Plan of care: Patient will be seen twice daily for therapeutic exercise, functional retraining, endurance activities, balance exercises, family and patient education. AM-[facility] Mobility Inpatient   How much difficulty turning over in bed?: A Little  How much difficulty sitting down on / standing up from a chair with arms?: A Lot  How much difficulty moving from lying on back to sitting on side of bed?: A Little  How much help from another person moving to and from a bed to a chair?: A Lot  How much help from another person needed to walk in hospital room?: A Lot  How much help from another person for climbing 3-5 steps with a railing?: A Lot  -PAC Inpatient Mobility Raw Score : 14  AM-PAC Inpatient T-Scale Score : 38.1  Mobility Inpatient CMS 0-100% Score: 61.29  Mobility Inpatient CMS G-Code Modifier : CL      Order: evaluate and treat  Diagnosis:    1. Arthritis of right knee    2.  History of surgery     s/p total knee   replacement; Dr. Phoenix Cueto    Past medical history:       Diagnosis Date    Anemia     Arthritis     Blanco's esophagus 2013    COPD (chronic obstructive pulmonary disease) ([facility] 2005    Diabetes mellitus ([facility] 2005    type 2, borderline    DJD (degenerative joint disease)     DJD (degenerative joint disease)     GERD (gastroesophageal reflux disease)     Hyperlipidemia     Hypertension     Neuropathy     Osteoarthritis     Vertigo, labyrinthine    ;      Procedure Laterality Date    CARPAL TUNNEL RELEASE Right 11/2014    CERVICAL FUSION  12/28/2015    anterior cervical discectomy and fusion    CHOLECYSTECTOMY      FOOT SURGERY      bilateral    HYSTERECTOMY      INCONTINENCE SURGERY  nov 2011    cystoscopy transobturator sling    LAMINECTOMY  10/25/2018       The admitting diagnosis and active problem list as listed above have been reviewed prior to the initiation of this evaluation. Nursing cleared the patient for evaluation. Patient is agreeable to evaluation. Precautions: falls, full weight bearing     Social history: Patient lives with family   in a single family home with 3 steps to enter with with rail    Equipment owned: straight cane and shower chair,  wheeled walker    Mentation:  , cooperative, oriented x 3 and follows directions with repetition,  groggy  Prior level of function: independent with cane    ROM: within functional limits with exception of right knee 0-50 degrees  STRENGTH: within functional limits with exception of right lower extremity   2/5  PAIN: (measured on a visual analog scale with 0=no pain and 10=excruciating pain) 10/10. With movement and extension in standing  FUNCTIONAL ASSESSMENT   Bed Mobility- Supine to sit-Moderate assistance using leg       Scooting- Minimal assists       Sit to supine- Minimal assists  using leg     Patient able to dangle on edge of bed for 10 minutes with supervision  assist   Transfers-sit to stand- Moderate assistance  First attmept; min a second attempt   Gait:  Patient ambulated 2-3 Steps to head of bed   using  wheeled walker  with Moderate assistance to assist with knee extension and proper walker movement and sequencing; grogginess is barrier to safety and mobility. Balance: sit-good       stand poor      Edema: yes -      Endurance: poor      Treatment: evaluation, dangle, gait  Knee flexion x 10 reps a/a  Patient/family education: Reviewed goals and plan of care. Patient/family instructed in ankle pumps, quad sets, proper positioning of lower extremity to prevent contracture,use incentive spirometer and pain medicine prior to therapy treatment.       Patient/family verbalized understanding and needs reinforcement        Rehab Potential: good  for baseline  Patients Goal:  Do better

## 2019-04-09 NOTE — OP NOTE
Operative report        DATE OF PROCEDURE: 4/9/2019     SURGEON: Dalia Appiah    ASSISTANT: Natalia Billingsley    PREOPERATIVE DIAGNOSIS: Degenerative arthrosis right knee    POSTOPERATIVE DIAGNOSIS: Same    OPERATION: Right total knee replacement arthroplasty    ANESTHESIA: Gen. ESTIMATED BLOOD LOSS:  Less than 50 mL    COMPLICATIONS: None    SPECIMENS: Was sent to pathology    HISTORY: The patient is a 80y.o. year old female with history of above preop diagnosis. I explained the risk, benefits, expected outcome, and alternatives to the procedure. Patient understands and is in agreement. PROCEDURE:  The patient was brought to the operating room after site and side were identified. Adequate general anesthetic was given by anesthesia department with the patient supine on the table. A tourniquet was placed on the right upper thigh. Right lower extremity was then prepped draped sterile fashion. A straight anterior midline incision was marked. Right lower extremity was exsanguinated with elastic wrap and tourniquet pressure was set at 300 mmHg. The incision was made and deepened down through subcu tissues. Medial and lateral full-thickness flaps are elevated and then median parapatellar capsulotomy was performed. Portion of the fat pad was removed from around the patellar tendon and the anterior horns of menisci were removed. The patella was measured for thickness and cut for appropriate thickness for replacement in the protective metal disc was applied to this. Was then retracted laterally to expose the anterior portion of the knee. A portion of the medial capsule was elevated from the medial tibia proximally. Osteophytes were then removed from around the rim where the bone was sclerotic and overgrown. The cruciates were sacrificed. I am drill holes were then made in the femur and the tibia for guidance of cutting blocks. The femur was started with a 4° valgus 8 mm resection cut set on the guidance. The block was then pinned and the distal cut was made. The femur was then sized at 4 and 4-in-1 cutting block was applied and anterior and posterior and chamfer cuts were made. Further soft tissue clean up was done at this time. The tibia was then retracted forward and remainder of the meniscus cartilages were removed. The tibia was then set up for the proximal cut relative to the long axis of the tibia roughly perpendicular to this. The block was moved 2 mm distal because the sclerotic medial bone and then a bone cut was made in a good spot surface was produced. Free bodies were removed from the posterior aspect of the joint and resections of posterior osteophytes was done as well. The tensiometer was then used to check flexion and extension gap and medial and lateral balance. The tibia was then prepared with the size 4 baseplate which gave best coverage without overhang. Rotation was carefully chosen and the block was pinned. The 9 mm bearing was initially trialed with the trial femoral component. This was loosened we used the 13 mm bearing after this and this gave us excellent stability in flexion and extension and also with good medial lateral balance. The final components were chosen to match these. The tibia was then prepared with the fin broach for the tibial baseplate. The femur was then prepared with the box cut for the posterior stabilized component. The patella was then sized at 29 and prepared for the symmetric bearing. The trials were removed and the area was thoroughly irrigated. A bone plug was placed in the distal femoral canal. Local anesthetic was injected surrounding soft tissues for postoperative pain control. Cement was mixed semi-viscus and all components were cemented. Excess cement was removed during initial impaction and after partial set up. The knee was thoroughly irrigated as the cement set up and the patella was held with a self-retaining clamp.     The capsule was closed with 0 Vicryl figure-of-eight sutures the patellofemoral tension alignment were checked and these were good. The capsule was closed inferolateral adjacent to the patellar tendon were longitudinal spread at been noticed. The tranexamic acid solution was then injected in the knee joint. The subcu was irrigated and closed with 2-0 Vicryl. The skin was closed with Steri-Strips. Aquasol AG and a bulky dressing were applied. The tourniquet was deflated good circulation turned lower extremity. Patient was and taken recovery after reversal anesthetic in stable condition. GROSS PATHOLOGY: Advanced degeneration right knee with medial bony sclerosis and total loss of chondral full-thickness. Hypertrophic marginal changes was contracted ligaments. Synovial hypertrophy and hyperemia. COMPONENTS USED :  [employer] triathlon primary cemented knee components size 4 tibial baseplate. 13 mm posterior stabilized bearing. Size 4 right posterior stabilized cemented femoral component. 29 mm symmetric patella. [employer] Simplex P bone cement. All reasonable efforts have been made to minimize the risk of errors that may occur in the use of voice recognition and other electronic means of charting.       Electronically signed by Gabriel Daugherty DO on 4/9/19 at 12:56 PM

## 2019-04-09 NOTE — ANESTHESIA PROCEDURE NOTES
Peripheral Block    Patient location during procedure: PACU  Staffing  Anesthesiologist: Nilda Suarez MD  Preanesthetic Checklist  Completed: patient identified, site marked, surgical consent, pre-op evaluation, timeout performed, IV checked, risks and benefits discussed, monitors and equipment checked, anesthesia consent given, oxygen available and patient being monitored  Peripheral Block  Patient position: supine  Prep: ChloraPrep  Patient monitoring: cardiac monitor, continuous pulse ox, frequent blood pressure checks and IV access  Block type: Femoral  Laterality: right  Injection technique: single-shot  Procedures: ultrasound guided  Local infiltration: lidocaine  Infiltration strength: 1 %  Dose: 3 mL  Adductor canal  Provider prep: mask and sterile gloves  Local infiltration: lidocaine  Needle  Needle type: combined needle/nerve stimulator   Needle gauge: 22 G  Needle length: 10 cm  Needle localization: ultrasound guidance  Assessment  Injection assessment: negative aspiration for heme, no paresthesia on injection and local visualized surrounding nerve on ultrasound  Slow fractionated injection: yes  Hemodynamics: stable  Additional Notes        Reason for block: post-op pain management and at surgeon's request

## 2019-04-09 NOTE — PROGRESS NOTES
Pt to recovery for right adductor canal block, 0912 time out done, 0913 pt medicated and re medicated with fentanyl for effective sedation, with the use of ultrasound, 0920 block started and completed, VSS, pt sleepy awakens briefly to name, will cont to monitor

## 2019-04-09 NOTE — ANESTHESIA POSTPROCEDURE EVALUATION
Department of Anesthesiology  Postprocedure Note    Patient: Kavin Quintero  MRN: [id number]  [place]: 5/30/1929  Date of evaluation: 4/9/2019  Time:  2:13 PM     Procedure Summary     Date:  04/09/19 Room / Location:  [facility] / Isabella Burns    Anesthesia Start:  1031 Anesthesia Stop:  2162    Procedure:  RIGHT KNEE TOTAL ARTHROPLASTY (WILLIAM) (Right Knee) Diagnosis:  (DJD RIGHTKNEE)    Surgeon:  Balbir French DO Responsible Provider:  Marshall Gloria MD    Anesthesia Type:  general ASA Status:  3          Anesthesia Type: general    Latasha Phase I: Latasha Score: 9    Latasha Phase II:      Last vitals: Reviewed and per EMR flowsheets.        Anesthesia Post Evaluation    Patient location during evaluation: PACU  Patient participation: complete - patient participated  Level of consciousness: awake  Pain score: 0  Airway patency: patent  Nausea & Vomiting: no nausea  Complications: no  Cardiovascular status: blood pressure returned to baseline  Respiratory status: acceptable  Hydration status: euvolemic

## 2019-04-09 NOTE — CARE COORDINATION
SS Note/Discharge plan:  Consult for post-op d/c planning, pt's d/c plan to return home with [facility], [facility] [place] [address] [facility] and [address] [place] [address],[address] for a bsc to be delivered to hospital for anticiapted d/c tomorrow 4/10, nursing notified for HHC/DME orders. Electronically signed by ANNIA Kruse on 4/9/2019 at 2:41 PM

## 2019-04-10 VITALS
HEIGHT: 64 IN | DIASTOLIC BLOOD PRESSURE: 57 MMHG | SYSTOLIC BLOOD PRESSURE: 140 MMHG | TEMPERATURE: 98 F | BODY MASS INDEX: 27.14 KG/M2 | HEART RATE: 88 BPM | RESPIRATION RATE: 17 BRPM | WEIGHT: 159 LBS | OXYGEN SATURATION: 94 %

## 2019-04-10 PROBLEM — I10 ESSENTIAL HYPERTENSION: Status: ACTIVE | Noted: 2019-04-10

## 2019-04-10 PROBLEM — J44.9 COPD (CHRONIC OBSTRUCTIVE PULMONARY DISEASE) (HCC): Status: ACTIVE | Noted: 2019-04-10

## 2019-04-10 PROBLEM — M17.11 ARTHRITIS OF RIGHT KNEE: Status: ACTIVE | Noted: 2019-04-09

## 2019-04-10 PROBLEM — D62 ACUTE BLOOD LOSS ANEMIA: Status: ACTIVE | Noted: 2019-04-10

## 2019-04-10 PROBLEM — E78.5 HYPERLIPIDEMIA: Status: ACTIVE | Noted: 2019-04-10

## 2019-04-10 LAB
ALBUMIN SERPL-MCNC: 3.2 G/DL (ref 3.5–5.2)
ALP BLD-CCNC: 83 U/L (ref 35–104)
ALT SERPL-CCNC: 8 U/L (ref 0–32)
ANION GAP SERPL CALCULATED.3IONS-SCNC: 8 MMOL/L (ref 7–16)
AST SERPL-CCNC: 18 U/L (ref 0–31)
BILIRUB SERPL-MCNC: <0.2 MG/DL (ref 0–1.2)
BUN BLDV-MCNC: 23 MG/DL (ref 8–23)
CALCIUM SERPL-MCNC: 8.3 MG/DL (ref 8.6–10.2)
CHLORIDE BLD-SCNC: 102 MMOL/L (ref 98–107)
CO2: 26 MMOL/L (ref 22–29)
CREAT SERPL-MCNC: 1.2 MG/DL (ref 0.5–1)
GFR AFRICAN AMERICAN: 51
GFR NON-AFRICAN AMERICAN: 42 ML/MIN/1.73
GLUCOSE BLD-MCNC: 155 MG/DL (ref 74–99)
HCT VFR BLD CALC: 32.8 % (ref 34–48)
HEMOGLOBIN: 10.4 G/DL (ref 11.5–15.5)
POTASSIUM SERPL-SCNC: 4.5 MMOL/L (ref 3.5–5)
SODIUM BLD-SCNC: 136 MMOL/L (ref 132–146)
TOTAL PROTEIN: 5.4 G/DL (ref 6.4–8.3)

## 2019-04-10 PROCEDURE — 97535 SELF CARE MNGMENT TRAINING: CPT

## 2019-04-10 PROCEDURE — 99233 SBSQ HOSP IP/OBS HIGH 50: CPT | Performed by: INTERNAL MEDICINE

## 2019-04-10 PROCEDURE — 36415 COLL VENOUS BLD VENIPUNCTURE: CPT

## 2019-04-10 PROCEDURE — 6370000000 HC RX 637 (ALT 250 FOR IP): Performed by: ORTHOPAEDIC SURGERY

## 2019-04-10 PROCEDURE — 80053 COMPREHEN METABOLIC PANEL: CPT

## 2019-04-10 PROCEDURE — 97110 THERAPEUTIC EXERCISES: CPT

## 2019-04-10 PROCEDURE — 85018 HEMOGLOBIN: CPT

## 2019-04-10 PROCEDURE — 97530 THERAPEUTIC ACTIVITIES: CPT

## 2019-04-10 PROCEDURE — 2580000003 HC RX 258: Performed by: ORTHOPAEDIC SURGERY

## 2019-04-10 PROCEDURE — 97116 GAIT TRAINING THERAPY: CPT

## 2019-04-10 PROCEDURE — 85014 HEMATOCRIT: CPT

## 2019-04-10 RX ADMIN — HYDROCODONE BITARTRATE AND ACETAMINOPHEN 2 TABLET: 5; 325 TABLET ORAL at 00:49

## 2019-04-10 RX ADMIN — PANTOPRAZOLE SODIUM 40 MG: 40 TABLET, DELAYED RELEASE ORAL at 06:22

## 2019-04-10 RX ADMIN — HYDROCODONE BITARTRATE AND ACETAMINOPHEN 2 TABLET: 5; 325 TABLET ORAL at 08:04

## 2019-04-10 RX ADMIN — MULTIPLE VITAMINS W/ MINERALS TAB 1 TABLET: TAB at 08:03

## 2019-04-10 RX ADMIN — VITAMIN D, TAB 1000IU (100/BT) 1000 UNITS: 25 TAB at 08:02

## 2019-04-10 RX ADMIN — Medication 10 ML: at 08:06

## 2019-04-10 RX ADMIN — ASPIRIN 325 MG: 325 TABLET, DELAYED RELEASE ORAL at 08:04

## 2019-04-10 RX ADMIN — CYANOCOBALAMIN TAB 1000 MCG 1000 MCG: 1000 TAB at 08:03

## 2019-04-10 RX ADMIN — DOCUSATE SODIUM 100 MG: 100 CAPSULE ORAL at 08:03

## 2019-04-10 RX ADMIN — HYDROCODONE BITARTRATE AND ACETAMINOPHEN 2 TABLET: 5; 325 TABLET ORAL at 12:05

## 2019-04-10 RX ADMIN — ASPIRIN 81 MG: 81 TABLET, COATED ORAL at 08:03

## 2019-04-10 ASSESSMENT — PAIN DESCRIPTION - FREQUENCY: FREQUENCY: INTERMITTENT

## 2019-04-10 ASSESSMENT — PAIN - FUNCTIONAL ASSESSMENT: PAIN_FUNCTIONAL_ASSESSMENT: ACTIVITIES ARE NOT PREVENTED

## 2019-04-10 ASSESSMENT — PAIN SCALES - GENERAL
PAINLEVEL_OUTOF10: 7
PAINLEVEL_OUTOF10: 7
PAINLEVEL_OUTOF10: 3
PAINLEVEL_OUTOF10: 7

## 2019-04-10 ASSESSMENT — PAIN DESCRIPTION - PAIN TYPE: TYPE: SURGICAL PAIN

## 2019-04-10 ASSESSMENT — PAIN DESCRIPTION - ORIENTATION: ORIENTATION: RIGHT

## 2019-04-10 ASSESSMENT — PAIN DESCRIPTION - LOCATION: LOCATION: KNEE

## 2019-04-10 ASSESSMENT — PAIN DESCRIPTION - DESCRIPTORS: DESCRIPTORS: DISCOMFORT;SORE

## 2019-04-10 NOTE — PROGRESS NOTES
Occupational Therapy  O.T. Bedside Treatment Note    Date:4/10/2019  Patient Name: Kavin Quintero  MRN: [id number]  : 1929  ROOM #: 0320/0320-01    Problem list / Diagnosis:   Patient Active Problem List   Diagnosis    Cervical pain (neck)    Bulging of cervical intervertebral disc without myelopathy    Cervical osteophyte    Cervical spine instability    S/P cervical spinal fusion    Arthritis of right knee    Essential hypertension    COPD (chronic obstructive pulmonary disease) ([facility]    Hyperlipidemia    Acute blood loss anemia    Postoperative anemia    Status post total right knee replacement     Past Medical History:   Past Medical History:   Diagnosis Date    Anemia     Arthritis     Blanco's esophagus     COPD (chronic obstructive pulmonary disease) ([facility]     Diabetes mellitus ([facility]     type 2, borderline    DJD (degenerative joint disease)     DJD (degenerative joint disease)     GERD (gastroesophageal reflux disease)     Hyperlipidemia     Hypertension     Neuropathy     Osteoarthritis     Vertigo, labyrinthine      Onset/Medical history: medical history reviewed  Past medical history: reviewed    The admitting diagnosis and active problem list as listed above have been reviewed prior to treatment. Nursing cleared the patient for treatment. Patient is agreeable to treatment.     Discharge recommendations: [facility] prescriptions needed: family prefers toilet riser with rails or narrow bedside commode if riser is not available  Comment: dressing kit and elastic laces, walker bag provided    AM - PAC Daily Activity Inpatient     AM-PAC Daily Activity Inpatient   How much help for putting on and taking off regular lower body clothing?: A Lot  How much help for Bathing?: A Lot  How much help for Toileting?: A Little  How much help for putting on and taking off regular upper body clothing?: A Little  How much help for taking care of personal grooming?: A Little  How much help for eating meals?: None  AM-PAC Inpatient Daily Activity Raw Score: 17      Precautions: falls, full weight bearing: R LE d/t TKA, hx of spinal sx November 2018      S:  - Pt cleared for treatment through nursing.  - Pain: pt c/o some pain in R knee, however pt did not rate pain. Nsg aware. - Pt pleasant and cooperative during session. O:    Function Assessment     Status  Goal Comment   Feeding:  Independent   Independent   To bring cup to mouth   Grooming:  Minimal Assist  Independent   While standing sinkside to simulate grooming tasks in clinic   UE dressing:  Minimal Assist  Independent   Per last report; pt had donned shirt prior to session   LE dressing: Mod Assist Modified [place]   Pt states she had assistance to don pants; education on technique, as well as review of AE for LE dressing; pt familiar with AE d/t spinal sx a few months ago; pt required assistance to don NEERU hose with use of easy slide; good understanding noted   Bathing: Moderate Assist  Modified [place]   Pt education, demonstration and participation with use of DME in clinic for bathroom safety/safe transfers.  Min  Assist for balance as pt stepped into/out of simulated tub/shower with use of handrails; pt also provided with education on using chair outside of shower and placing LE's into tub when seated then standing and transferring to shower chair with use of hand rails; good understanding noted     Bed Mobility:       Supervision  for supine to sit using leg ,   Supervision  for scooting,  Min Assist  for sit to supine using leg  and assist to support R LE d/t fatigue Independent      Functional Transfers:    Min A for sit to stand transfers to/ from EOB with  wheeled walker; transfers to/from w/c and multiple surfaces in clinic with min A with FWW; cuing for hand placement/safety; supervision for simulated car transfer with pt able to lift B LE's into/out of simulated car Independent  Safety: fair Cuing for hand placement when standing from EOB, as pt placed B hands on walker initially d/t B hand arthritis   Functional Mobility: 15 ft x 2 in room and 6 ft x 2 and 20 ft x 2 in clinic with min A progressing to supervision with FWW [place]  No LOB noted; cuing for sequencing/hand placement, safety         A:  -Balance: Sitting: fair       Standing: fair with Min A progressing to supervision with FWW  -Endurance: fair Kaz Spell + (2* to decreased endurance, strength)  -Edema: yes - RLE; nsg aware ; assist to don NEERU hose; SCD's donned; ice provided  -Safety:fair  (VC's for walker safety, sequencing, hand placement, joint protection)  - Pt/family education/training: bed mobility, use of leg ,  transfer training, functional mobility, AE for LE dressing, LE dressing technique, AA to don NEERU hose,  Toilet, car, shower transfers, sequencing, hand placement, walker safety, bathroom safety, DME,  ECT's,  joint protection techniques,  ADL retraining, self-feeding  -Pt would benefit from additional ADLs, safety education, balance activities, transfer training, strength exercises, and over all endurance building.     P:  - Plan of care: Patient will be D/C'd with HHOT PRN and family supervision/assist.   - Patient and/or family understands diagnosis, prognosis and plan of care: yes       Treatment minutes: [facility], [address] [place] [address]

## 2019-04-10 NOTE — H&P
[facility]      Reason for Consult:  Medical management    History of Present Illness:  80 y.o. female with a history of COPD, htn, hld, knee arthritis presents to hospital for Total Knee replacement of R knee. Operation notes show limited blood loss, and no complications. There is notable decrease in blood counts and slight bump in creatinine noted to most recent labs. Pt denies CP, SOB, difficulty breathing. Reports pain is under control. Denies fevers or chills    Informant(s) for H&P: pt    REVIEW OF SYSTEMS:  Complete ROS performed with patient, pertinent positives and negatives are listed in the HPI. PMH:  Past Medical History:   Diagnosis Date    Anemia     Arthritis     Blanco's esophagus 2013    COPD (chronic obstructive pulmonary disease) ([facility] [address] 2005    Diabetes mellitus ([facility] [address] 2005    type 2, borderline    DJD (degenerative joint disease)     DJD (degenerative joint disease)     GERD (gastroesophageal reflux disease)     Hyperlipidemia     Hypertension     Neuropathy     Osteoarthritis     Vertigo, labyrinthine        Surgical History:  Past Surgical History:   Procedure Laterality Date    CARPAL TUNNEL RELEASE Right 11/2014    CERVICAL FUSION  12/28/2015    anterior cervical discectomy and fusion    CHOLECYSTECTOMY      FOOT SURGERY      bilateral    HYSTERECTOMY      INCONTINENCE SURGERY  nov 2011    cystoscopy transobturator sling    LAMINECTOMY  10/25/2018       Medications Prior to Admission:    Prior to Admission medications    Medication Sig Start Date End Date Taking? Authorizing Provider   HYDROcodone-acetaminophen (NORCO)  MG per tablet Take 1 tablet by mouth every 6 hours as needed for Pain for up to 7 days.  Intended supply: 30 days 4/9/19 4/16/19 Yes Christiano Harper, DO   aspirin 325 MG EC tablet Take 1 tablet by mouth daily 4/9/19 4/8/20 Yes Christiano Harper, DO   aspirin 81 MG tablet Take 81 mg by mouth daily   Yes Historical Provider, MD   Nutritional Supplements (ENSURE ACTIVE HIGH PROTEIN PO) Take by mouth 2 times daily   Yes Historical Provider, MD   Multiple Vitamins-Minerals (THERAPEUTIC MULTIVITAMIN-MINERALS) tablet Take 1 tablet by mouth daily   Yes Historical Provider, MD   Multiple Vitamins-Minerals (OCUVITE ADULT FORMULA PO) Take by mouth daily   Yes Historical Provider, MD   omeprazole (PRILOSEC) 40 MG capsule Take 40 mg by mouth daily Takes every other day   Yes Historical Provider, MD   lisinopril (PRINIVIL;ZESTRIL) 5 MG tablet Take 5 mg by mouth daily. Yes Historical Provider, MD   simvastatin (ZOCOR) 20 MG tablet Take 20 mg by mouth nightly. Yes Historical Provider, MD   vitamin D (CHOLECALCIFEROL) 400 UNIT TABS tablet Take 1,000 Units by mouth daily. Yes Historical Provider, MD   vitamin B-12 (CYANOCOBALAMIN) 1000 MCG tablet Take 1,000 mcg by mouth daily. Yes Historical Provider, MD   Naproxen Sodium 220 MG CAPS Take 1 tablet by mouth as needed for Pain Hold 5 days    Historical Provider, MD       Allergies:    Patient has no known allergies. Social History:    reports that she has quit smoking. She has a 7.50 pack-year smoking history. She has never used smokeless tobacco. She reports that she drinks about 1.8 oz of alcohol per week. She reports that she does not use drugs. Family History:   family history includes Cancer in her mother; Emphysema in her father.     PHYSICAL EXAM:  Vitals:  BP (!) 141/56   Pulse 86   Temp 98.5 °F (36.9 °C) (Oral)   Resp 20   Ht 5' 4\" (1.626 m)   Wt 159 lb (72.1 kg)   SpO2 98%   BMI 27.29 kg/m²     General Appearance: alert and oriented to person, place and time and in no acute distress  Skin: warm and dry  Head: normocephalic and atraumatic  Eyes: pupils equal, round, and reactive to light, extraocular eye movements intact, conjunctivae normal  Neck: neck supple and non tender without mass   Pulmonary/Chest: clear to

## 2019-04-10 NOTE — PROGRESS NOTES
Physical Therapy  PHYSICAL THERAPY  TREATMENT NOTE    4/10/2019  0320/0320-01                      Patient Name: Swati Mckeon      [id number]                              5/30/1929     Recommendation for discharge: [address] prescriptions needed: elevated toilet seated preferred    AM-[facility] Mobility Inpatient   How much difficulty turning over in bed?: A Little  How much difficulty sitting down on / standing up from a chair with arms?: A Little  How much difficulty moving from lying on back to sitting on side of bed?: A Little  How much help from another person moving to and from a bed to a chair?: A Little  How much help from another person needed to walk in hospital room?: A Little  How much help from another person for climbing 3-5 steps with a railing?: A Little  [facility] Inpatient Mobility Raw Score : 18  AM-[facility] Inpatient T-Scale Score : 43.63  Mobility Inpatient CMS 0-100% Score: 46.58  Mobility Inpatient CMS G-Code Modifier : CK      Patient Active Problem List   Diagnosis    Cervical pain (neck)    Bulging of cervical intervertebral disc without myelopathy    Cervical osteophyte    Cervical spine instability    S/P cervical spinal fusion    Arthritis of right knee    Essential hypertension    COPD [facility]chronic obstructive [facility] disease) ([facility] [address]    Hyperlipidemia    Acute blood loss anemia    Postoperative anemia    Status post total right knee replacement         Weight bearing/Precautions: falls, R FWB    S: Patient cleared by nursing for treatment. Patient is agreeable to treatment. Pt c/o pain in R knee  Pain status: (measured on a visual analog scale with 0=no pain and 10=excruciating pain) 7/10. O: Pt was instructed in and performed the following:   Bed Mobility-  Supine to sit- Supervision     Scooting- Supervision     Sit to supine- Supervision     Transfers-sit to stand- Minimal assists x 1    Gait:  Patient ambulated 30 feet using Wheeled Walker with Minimal assists x 1.  Verbal cues required for sequencing, obstacle negotiation, safety, upright posture, weight bearing,  increased base of support, and increased step length. Steps: Pt ascended/ descended 7 steps using 2 rails w/ minimal assist. Pt ascended/descended 7 steps using 1 rail and 1 crutch w/ minimal assist. V/c for sequencing and safety. Treatment: Pt in chair at beginning of tx session. Pt amb in hallway to perform stair training to simulate home environment and returned back to bed to perform ex's. Pt performed supine ex's: ankle pumps, gluteal sets, quad sets, heel slides, hip abduction, straight leg raise  x 10 reps. Pt instructed on using a leg  for bed mobility, to assist with exercises flexion and extension, proper hand placement and increased knee flexion with each sit to stand transfer and to elevate her R leg higher than her heart while sitting more than 1 hour or more and in bed to decrease swelling and to use leg  for dorsiflexion to increase her knee extension when elevated. Family education/handouts: Pt's family not present. Comment: Call light left by patient. Pt in wheelchair at end of tx session in care of [address] [place] [address]. A: Pt needed consistent cueing on sequencing during amb and stair training. Pt performed all ex's AROM and used leg  to assist w/ ex's on RLE. P: Continue with physical therapy    [address] [place] [address], [facility] Mar[facility], PTA     Goals to be met in 2 days. Bed mobility-Supervision  Transfers-Supervision  Ambulation-Supervision for 75 feet using  wheeled walker  Stairs-up and down 3  step(s) using 1 rail(s) and crutch vs cane with Minimal assists  . Comments:    Increase rom in affected joints by 10%  Increase strength in affected muscle groups by 1/3 grade  Increase balance to allow for improvement towards functional goals. Increase endurance to allow for improvement towards functional goals.

## 2019-04-10 NOTE — PROGRESS NOTES
Physical Therapy  PHYSICAL THERAPY  TREATMENT NOTE    4/10/2019  0320/0320-01                      Patient Name: Zachariah Schmidt      [id number]                              5/30/1929     Recommendation for discharge: [address] prescriptions needed: elevated toilet seated preferred    AM[facility] Mobility Inpatient   How much difficulty turning over in bed?: A Little  How much difficulty sitting down on / standing up from a chair with arms?: A Little  How much difficulty moving from lying on back to sitting on side of bed?: A Little  How much help from another person moving to and from a bed to a chair?: A Little  How much help from another person needed to walk in hospital room?: A Little  How much help from another person for climbing 3-5 steps with a railing?: A Little  AM-[facility] Inpatient Mobility Raw Score : 18  AM-[facility] Inpatient T-Scale Score : 43.63  Mobility Inpatient CMS 0-100% Score: 46.58  Mobility Inpatient CMS G-Code Modifier : CK      Patient Active Problem List   Diagnosis    Cervical pain (neck)    Bulging of cervical intervertebral disc without myelopathy    Cervical osteophyte    Cervical spine instability    S/P cervical spinal fusion    Arthritis of right knee    Essential hypertension    COPD (chronic obstructive pulmonary disease) ([facility] [address]    Hyperlipidemia    Acute blood loss anemia    Postoperative anemia    Status post total right knee replacement         Weight bearing/Precautions: falls, R FWB    S: Patient cleared by nursing for treatment. Patient is agreeable to treatment. Pt c/o pain in R knee  Pain status: (measured on a visual analog scale with 0=no pain and 10=excruciating pain) 7/10. O: Pt was instructed in and performed the following:   Bed Mobility-  Supine to sit- Supervision     Scooting- Supervision     Sit to supine- NA     Transfers-sit to stand- Minimal assists x 1    Gait:  Patient ambulated 55 feet using Wheeled Walker with Minimal assists x 1.  Verbal cues required for sequencing, obstacle negotiation, safety, upright posture, weight bearing,  increased base of support, and increased step length. Steps: Pt ascended/descended 14 steps using 1 rail and 1 crutch w/ minimal assist. V/c for sequencing and safety. Treatment: Pt in bed at beginning of tx session. Pt amb in hallway to perform stair training to simulate home environment. Pt performed seated ex's: ankle pumps,  hip abduction/adduction, LAQ, and marches  x 10 reps. Pt instructed on using a leg  for bed mobility, to assist with exercises flexion and extension, proper hand placement and increased knee flexion with each sit to stand transfer and to elevate her R leg higher than her heart while sitting more than 1 hour or more and in bed to decrease swelling and to use leg  for dorsiflexion to increase her knee extension when elevated. Family education/handouts: Pt's  and son present throughout tx session. Comment: Call light left by patient. Pt sitting at EOB at end of tx session w/ family present and nursing notified. A: Pt needed consistent cueing on sequencing during amb and stair training. Pt performed all ex's AROM w/ assist during LAQ's on RLE. P: Continue with physical therapy    [address] [place] [address], [facility]  Renny Bhandari. Marlen, [facility]     Goals to be met in 2 days. Bed mobility-Supervision  Transfers-Supervision  Ambulation-Supervision for 75 feet using  wheeled walker  Stairs-up and down 3  step(s) using 1 rail(s) and crutch vs cane with Minimal assists  . Comments:    Increase rom in affected joints by 10%  Increase strength in affected muscle groups by 1/3 grade  Increase balance to allow for improvement towards functional goals. Increase endurance to allow for improvement towards functional goals.

## 2019-04-10 NOTE — PROGRESS NOTES
[facility] [place] [address] Hospitalist   Progress Note    Admitting Date and Time: 4/9/2019  7:46 AM  Admit Dx: Arthritis of knee [M17.10]    Subjective:    Pt feels good. She states had good physical therapy session this morning scheduled for another one this afternoon. She is eager to go home. She does realize she'll need a bedside commode. Per RN: Patient does well with afternoon physical therapy session, there is a plan for discharge today. However, she may require another evening stay. ROS: denies fever, chills, cp, sob, n/v, HA unless stated above.      [Held by provider] lisinopril  5 mg Oral Daily    [Held by provider] simvastatin  20 mg Oral Nightly    vitamin D  1,000 Units Oral Daily    vitamin B-12  1,000 mcg Oral Daily    pantoprazole  40 mg Oral QAM AC    aspirin  81 mg Oral Daily    therapeutic multivitamin-minerals  1 tablet Oral Daily    sodium chloride flush  10 mL Intravenous 2 times per day    docusate sodium  100 mg Oral BID    aspirin  325 mg Oral Daily       sodium chloride flush 10 mL PRN   ondansetron 4 mg Q6H PRN   HYDROcodone 5 mg - acetaminophen 1 tablet Q4H PRN   Or     HYDROcodone 5 mg - acetaminophen 2 tablet Q4H PRN   morphine 2 mg Q3H PRN   morphine 4 mg Q3H PRN        Objective:    /64   Pulse 76   Temp 97.9 °F (36.6 °C) (Oral)   Resp 16   Ht 5' 4\" (1.626 m)   Wt 159 lb (72.1 kg)   SpO2 94%   BMI 27.29 kg/m²   General Appearance: alert and oriented to person, place and time and in no acute distress  Skin: warm and dry  Head: normocephalic and atraumatic  Eyes: pupils equal, round, and reactive to light, extraocular eye movements intact, conjunctivae normal  Neck: neck supple and non tender without mass   Pulmonary/Chest: clear to auscultation bilaterally- no wheezes, rales or rhonchi, normal air movement, no respiratory distress  Cardiovascular: normal rate, normal S1 and S2 and no carotid bruits  Abdomen: soft, non-tender, non-distended, normal bowel sounds, no masses or organomegaly  Extremities: Right knee with bandage in place  Neurologic: no cranial nerve deficit and speech normal      Recent Labs     04/10/19  0412      K 4.5      CO2 26   BUN 23   CREATININE 1.2*   GLUCOSE 155*   CALCIUM 8.3*       Recent Labs     04/10/19  0412   HGB 10.4*   HCT 32.8*         Radiology:   XR KNEE RIGHT (1-2 VIEWS)   Final Result   No acute postoperative changes. Assessment:  Principal Problem:    Arthritis of knee  Active Problems:    Essential hypertension    COPD (chronic obstructive pulmonary disease) ([facility])    Hyperlipidemia    Acute blood loss anemia  Resolved Problems:    * No resolved hospital problems. *      Plan:    1. Osteoarthritis of the right knee--postop day #1 total knee replacement. Possible discharge home today pending her progress with physical therapy today. 2. Hypertension--lisinopril put on hold for a slightly elevated creatinine. Monitor blood pressure. Likely will be able to resume lisinopril at discharge. 3. Postop anemia (acute blood loss anemia)--hemoglobin preadmission was 12.5. Today hemoglobin 10.4.  4. COPD--encourage incentive spirometry. Continue home MDI regimen. 5. Hyperlipidemia--resume Zocor. NOTE: This report was transcribed using voice recognition software.  Every effort was made to ensure accuracy; however, inadvertent computerized transcription errors may be present.     Electronically signed by Anna Ulloa MD on 4/10/2019 at 10:56 AM

## 2019-04-12 ENCOUNTER — TELEPHONE (OUTPATIENT)
Dept: ORTHOPEDIC SURGERY | Age: 84
End: 2019-04-12

## 2019-04-12 NOTE — TELEPHONE ENCOUNTER
Rosa Ballard from [facility] care called and stated that patient is having increase of drainage from surgical site. She is also complaining of lung congestion and contacted her PCP for antibiotic. Per her PCP they told her that she needs to contact Dr Debbie Morgan for antibiotic. We reached out to Dr Debbie Morgan to advise. Dr Debbie Morgan did call Rosa Ballard and told her to change the dressings and he will not call in antibiotic and she needs to call her PCP. Rosa Ballard is going to put in a request for additional visit for dressing changes.  Electronically signed by Amari Rosenberg MA on 4/12/19 at 2:33 PM

## 2019-04-24 ENCOUNTER — TELEPHONE (OUTPATIENT)
Dept: ORTHOPEDIC SURGERY | Age: 84
End: 2019-04-24

## 2019-04-26 ENCOUNTER — OFFICE VISIT (OUTPATIENT)
Dept: ORTHOPEDIC SURGERY | Age: 84
End: 2019-04-26

## 2019-04-26 VITALS — HEIGHT: 65 IN | WEIGHT: 159 LBS | BODY MASS INDEX: 26.49 KG/M2

## 2019-04-26 DIAGNOSIS — M17.11 PRIMARY OSTEOARTHRITIS OF RIGHT KNEE: Primary | ICD-10-CM

## 2019-04-26 DIAGNOSIS — Z96.651 STATUS POST TOTAL RIGHT KNEE REPLACEMENT USING CEMENT: ICD-10-CM

## 2019-04-26 PROCEDURE — 99024 POSTOP FOLLOW-UP VISIT: CPT | Performed by: ORTHOPAEDIC SURGERY

## 2019-04-26 RX ORDER — SULFAMETHOXAZOLE AND TRIMETHOPRIM 800; 160 MG/1; MG/1
1 TABLET ORAL 2 TIMES DAILY
Qty: 14 TABLET | Refills: 0 | Status: SHIPPED | OUTPATIENT
Start: 2019-04-26 | End: 2019-05-03

## 2019-04-26 NOTE — PROGRESS NOTES
Chief Complaint:   Chief Complaint   Patient presents with    Post-Op Check     post-op from Right TKR DOS 4/9/19. Patient states she has wound discharge that is yellow and she has had this drainage since Tuesday. Jody Rivera  is 17 days post right total knee replacement arthroplasty. She's doing well. She says that there've been some drainage and this was reported by the home health care nurses. They were concerned about this. This James Braxton says the drainage is decreased since his staples were removed and is drastically improved and she is doing pretty well postop. She is walking and bending it well. She is not having much trouble with the wound at all      Allergies; medications; past medical, surgical, family, and social history; and problem list have been reviewed today and updated as indicated in this encounter seen below. Exam: The inferior extent of the incision has a little bit of yellowish drainage. There is no odor to it. It is not purulent. There is only some very slight surrounding edema. This is superficial. Range of motion of her knee is near 0-100° or more flexion this early postop which is great. Her calf is supple she has little edema in little effusion in the knee joint. ASSESSMENT:    Noelle Cantrell was seen today for post-op check. Diagnoses and all orders for this visit:    Primary osteoarthritis of right knee    Status post total right knee replacement using cement  -     sulfamethoxazole-trimethoprim (BACTRIM DS) 800-160 MG per tablet; Take 1 tablet by mouth 2 times daily for 7 days        PLAN: She'll keep the wound covered to prevent contamination from outside until it completely heals. I think it will do this. She'll be treated prophylactically with antibiotics as an additional measure. She may bathe the wound. Will follow up in 10-14 days. Return in about 2 weeks (around 5/10/2019).        Current Outpatient Medications   Medication Sig Dispense Refill    diaphoresis, fatigue, fever and unexpected weight change. Respiratory: Negative for cough, shortness of breath and wheezing. Cardiovascular: Negative for chest pain and palpitations. Neurological: Negative for dizziness, syncope, cephalgia. GI / : negative  Musculoskeletal: see HPI       Objective:   Physical Exam   Constitutional: Oriented to person, place, and time. and appears well-developed and well-nourished. :   Head: Normocephalic and atraumatic. Eyes: EOM are normal.   Neck: Neck supple. Cardiovascular: Normal rate and regular rhythm. Pulmonary/Chest: Effort normal. No stridor. No respiratory distress, no wheezes. Abdominal:  No abnormal distension. Neurological: Alert and oriented to person, place, and time. Skin: Skin is warm and dry. Psychiatric: Normal mood and affect.  Behavior is normal. Thought content normal.    ISHMAEL Scott DO    4/26/19  9:43 AM

## 2019-05-08 ENCOUNTER — OFFICE VISIT (OUTPATIENT)
Dept: ORTHOPEDIC SURGERY | Age: 84
End: 2019-05-08

## 2019-05-08 VITALS — WEIGHT: 159 LBS | HEIGHT: 65 IN | BODY MASS INDEX: 26.49 KG/M2

## 2019-05-08 DIAGNOSIS — M17.11 PRIMARY OSTEOARTHRITIS OF RIGHT KNEE: Primary | ICD-10-CM

## 2019-05-08 PROCEDURE — 99024 POSTOP FOLLOW-UP VISIT: CPT | Performed by: ORTHOPAEDIC SURGERY

## 2019-05-08 NOTE — PROGRESS NOTES
Take 1 tablet by mouth daily      Multiple Vitamins-Minerals (OCUVITE ADULT FORMULA PO) Take by mouth daily      omeprazole (PRILOSEC) 40 MG capsule Take 40 mg by mouth daily Takes every other day      Naproxen Sodium 220 MG CAPS Take 1 tablet by mouth as needed for Pain Hold 5 days      lisinopril (PRINIVIL;ZESTRIL) 5 MG tablet Take 5 mg by mouth daily.  simvastatin (ZOCOR) 20 MG tablet Take 20 mg by mouth nightly.  vitamin D (CHOLECALCIFEROL) 400 UNIT TABS tablet Take 1,000 Units by mouth daily.  vitamin B-12 (CYANOCOBALAMIN) 1000 MCG tablet Take 1,000 mcg by mouth daily. No current facility-administered medications for this visit.         Patient Active Problem List   Diagnosis    Cervical pain (neck)    Bulging of cervical intervertebral disc without myelopathy    Cervical osteophyte    Cervical spine instability    S/P cervical spinal fusion    Arthritis of right knee    Essential hypertension    [facility]    Hyperlipidemia    Acute blood loss anemia    Postoperative anemia    Status post total right knee replacement       Past Medical History:   Diagnosis Date    Anemia     Arthritis     Blanco's esophagus 2013    COPD [facility]chronic [facility]) ([facility] [address] 2005    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, borderline    DJD (degenerative joint disease)     DJD (degenerative joint disease)     GERD (gastroesophageal reflux disease)     Hyperlipidemia     Hypertension     Neuropathy     Osteoarthritis     Vertigo, labyrinthine        Past Surgical History:   Procedure Laterality Date    CARPAL TUNNEL RELEASE Right 11/2014    CERVICAL FUSION  12/28/2015    anterior cervical discectomy and fusion    CHOLECYSTECTOMY      FOOT SURGERY      bilateral    HYSTERECTOMY      INCONTINENCE SURGERY  nov 2011    cystoscopy transobturator sling    LAMINECTOMY  10/25/2018    TOTAL KNEE ARTHROPLASTY Right 4/9/2019

## 2019-05-16 ENCOUNTER — TELEPHONE (OUTPATIENT)
Dept: ORTHOPEDIC SURGERY | Age: 84
End: 2019-05-16

## 2019-05-16 NOTE — TELEPHONE ENCOUNTER
Patient called and stated that she had a fall on Monday (doctor did a right TKR DOS 4/9/19), patient did not got to ED but states her knee/leg is swollen and hurts. She did got to PT and they recommended for her to call us. I advised her to put ice on it  And if still hurts tomorrow give us a call. She gave me a verbal understanding.  Electronically signed by David Vyas MA on 5/16/19 at 12:12 PM

## 2019-05-17 ENCOUNTER — OFFICE VISIT (OUTPATIENT)
Dept: ORTHOPEDIC SURGERY | Age: 84
End: 2019-05-17

## 2019-05-17 VITALS — BODY MASS INDEX: 24.83 KG/M2 | WEIGHT: 149 LBS | HEIGHT: 65 IN

## 2019-05-17 DIAGNOSIS — M17.11 PRIMARY OSTEOARTHRITIS OF RIGHT KNEE: Primary | ICD-10-CM

## 2019-05-17 DIAGNOSIS — Z96.651 STATUS POST TOTAL RIGHT KNEE REPLACEMENT USING CEMENT: ICD-10-CM

## 2019-05-17 PROCEDURE — 99024 POSTOP FOLLOW-UP VISIT: CPT | Performed by: ORTHOPAEDIC SURGERY

## 2019-05-17 NOTE — PROGRESS NOTES
Chief Complaint:   Chief Complaint   Patient presents with    Post-Op Check     post-op from right TKR DOS 4/9/19, patient states she had a fall on Monday and landed on her knee, she complains of swelling, and pain       [facility] [place]  this 38 days post right total knee replacement arthroplasty. She had an incident 4 days ago which time she was stepping down and lost her balance and came down hard on her left leg and she developed soreness in the right in her right thigh became quite firm. She was concerned about this and about some injury or compromise the joint replacement. Allergies; medications; past medical, surgical, family, and social history; and problem list have been reviewed today and updated as indicated in this encounter seen below. Exam: There is only slight if any edema in the right thigh at the present time. She does have some tenderness to palpation. She has full function of the quadriceps muscle. The knee is stable and the right hip is stable as well. The incision is healing very well and right knee replacement. Her range of motion of the right knee is very near 0-105° or more flexion without any discomfort. She has a little bit of medial ligament laxity perhaps owing to release of contractures. There are no complications. Of the surgery indicated. ASSESSMENT:    Mandeep Arambula was seen today for post-op check. Diagnoses and all orders for this visit:    Primary osteoarthritis of right knee    Status post total right knee replacement using cement        PLAN: Continue exercises of the leg and her outpatient rehab. Will follow in 6 weeks. She had specific concerns about driving the car and I told her to dress that with physical therapist and perhaps do some stimulation exercises. Return in about 6 weeks (around 6/28/2019).        Current Outpatient Medications   Medication Sig Dispense Refill    aspirin 81 MG tablet Take 81 mg by mouth daily      Nutritional Supplements (ENSURE ACTIVE SURGERY  nov 2011    cystoscopy transobturator sling    LAMINECTOMY  10/25/2018    TOTAL KNEE ARTHROPLASTY Right 4/9/2019    RIGHT KNEE TOTAL ARTHROPLASTY ([facility]) performed by Sabiha Jerez DO at [id number]       No Known Allergies    Social History     Socioeconomic History    Marital status:      Spouse name: None    Number of children: None    Years of education: None    Highest education level: None   Occupational History    None   Social Needs    Financial resource strain: None    Food insecurity:     Worry: None     Inability: None    Transportation needs:     Medical: None     Non-medical: None   Tobacco Use    Smoking status: Former Smoker     Packs/day: 0.50     Years: 15.00     Pack years: 7.50    Smokeless tobacco: Never Used   Substance and Sexual Activity    Alcohol use: Yes     Alcohol/week: 1.8 oz     Types: 3 Cans of beer per week     Comment: 1-2 times week    Drug use: No    Sexual activity: None   Lifestyle    Physical activity:     Days per week: None     Minutes per session: None    Stress: None   Relationships    Social connections:     Talks on phone: None     Gets together: None     Attends [employer] service: None     Active member of club or organization: None     Attends meetings of clubs or organizations: None     Relationship status: None    Intimate partner violence:     Fear of current or ex partner: None     Emotionally abused: None     Physically abused: None     Forced sexual activity: None   Other Topics Concern    None   Social History Narrative    None       Review of Systems  As follows except as previously noted in HPI:  Constitutional: Negative for chills, diaphoresis, fatigue, fever and unexpected weight change. Respiratory: Negative for cough, shortness of breath and wheezing. Cardiovascular: Negative for chest pain and palpitations. Neurological: Negative for dizziness, syncope, cephalgia.   GI / : negative  Musculoskeletal: see HPI Objective:   Physical Exam   Constitutional: Oriented to person, place, and time. and appears well-developed and well-nourished. :   Head: Normocephalic and atraumatic. Eyes: EOM are normal.   Neck: Neck supple. Cardiovascular: Normal rate and regular rhythm. Pulmonary/Chest: Effort normal. No stridor. No respiratory distress, no wheezes. Abdominal:  No abnormal distension. Neurological: Alert and oriented to person, place, and time. Skin: Skin is warm and dry. Psychiatric: Normal mood and affect.  Behavior is normal. Thought content normal.    ISHMAEL Ortiz DO    5/17/19  11:06 AM

## 2019-06-26 ENCOUNTER — OFFICE VISIT (OUTPATIENT)
Dept: ORTHOPEDIC SURGERY | Age: 84
End: 2019-06-26

## 2019-06-26 VITALS — BODY MASS INDEX: 25.44 KG/M2 | WEIGHT: 149.03 LBS | HEIGHT: 64 IN

## 2019-06-26 DIAGNOSIS — Z96.651 STATUS POST TOTAL RIGHT KNEE REPLACEMENT USING CEMENT: Primary | ICD-10-CM

## 2019-06-26 PROCEDURE — 99024 POSTOP FOLLOW-UP VISIT: CPT | Performed by: ORTHOPAEDIC SURGERY

## 2019-06-26 NOTE — PROGRESS NOTES
Chief Complaint:   Chief Complaint   Patient presents with    Post-Op Check     post-op check from right TKR DOS 4/9/19, patient is done with PT and doing home exercises. Sheyla Faulkner is 11 weeks postop right total knee replacement arthroplasty. She is doing real well with the knee and served her well. She is comfortable with it and active. She does have some questions about whether it was ever go down. This implies some thickening or swelling in the knee. Allergies; medications; past medical, surgical, family, and social history; and problem list have been reviewed today and updated as indicated in this encounter seen below. Exam: The incision is well-healed. There is no effusion today. The thickening of the knee is pretty much as it will be and this was explained to her. This is normal postop. Range of motion of her knee is 0 to 110 degrees of flexion with good patellofemoral alignment. There is a little laxity of the medial collateral which is not a functional problem. Gait is smooth and she has a good pace. ASSESSMENT:    Arias Lyons was seen today for post-op check. Diagnoses and all orders for this visit:    Status post total right knee replacement using cement        PLAN: Continue exercising the right knee. Will follow an x-ray in 3 months. Return in about 3 months (around 9/26/2019) for R knee X.        Current Outpatient Medications   Medication Sig Dispense Refill    aspirin 81 MG tablet Take 81 mg by mouth daily      Nutritional Supplements (ENSURE ACTIVE HIGH PROTEIN PO) Take by mouth 2 times daily      Multiple Vitamins-Minerals (THERAPEUTIC MULTIVITAMIN-MINERALS) tablet Take 1 tablet by mouth daily      Multiple Vitamins-Minerals (OCUVITE ADULT FORMULA PO) Take by mouth daily      omeprazole (PRILOSEC) 40 MG capsule Take 40 mg by mouth daily Takes every other day      Naproxen Sodium 220 MG CAPS Take 1 tablet by mouth as needed for Pain Hold 5 days      lisinopril (PRINIVIL;ZESTRIL) 5 MG tablet Take 5 mg by mouth daily.  simvastatin (ZOCOR) 20 MG tablet Take 20 mg by mouth nightly.  vitamin D (CHOLECALCIFEROL) 400 UNIT TABS tablet Take 1,000 Units by mouth daily.  vitamin B-12 (CYANOCOBALAMIN) 1000 MCG tablet Take 1,000 mcg by mouth daily. No current facility-administered medications for this visit.         Patient Active Problem List   Diagnosis    Cervical pain (neck)    Bulging of cervical intervertebral disc without myelopathy    Cervical osteophyte    Cervical spine instability    S/P cervical spinal fusion    Arthritis of right knee    Essential hypertension    [facility]    Hyperlipidemia    Acute blood loss anemia    Postoperative anemia    Status post total right knee replacement       Past Medical History:   Diagnosis Date    Anemia     Arthritis     Blanco's esophagus 2013    COPD (chronic obstructive pulmonary disease) ([facility] 2005    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, borderline    DJD (degenerative joint disease)     DJD (degenerative joint disease)     GERD (gastroesophageal reflux disease)     Hyperlipidemia     Hypertension     Neuropathy     Osteoarthritis     Vertigo, labyrinthine        Past Surgical History:   Procedure Laterality Date    CARPAL TUNNEL RELEASE Right 11/2014    CERVICAL FUSION  12/28/2015    anterior cervical discectomy and fusion    CHOLECYSTECTOMY      FOOT SURGERY      bilateral    HYSTERECTOMY      INCONTINENCE SURGERY  nov 2011    cystoscopy transobturator sling    LAMINECTOMY  10/25/2018    TOTAL KNEE ARTHROPLASTY Right 4/9/2019    RIGHT KNEE TOTAL ARTHROPLASTY ([facility]) performed by Arnel Mazariegos DO at [id number]       No Known Allergies    Social History     Socioeconomic History    Marital status:      Spouse name: None    Number of children: None    Years of education: None    Highest education level: None Occupational History    None   Social Needs    Financial resource strain: None    Food insecurity:     Worry: None     Inability: None    Transportation needs:     Medical: None     Non-medical: None   Tobacco Use    Smoking status: Former Smoker     Packs/day: 0.50     Years: 15.00     Pack years: 7.50    Smokeless tobacco: Never Used   Substance and Sexual Activity    Alcohol use: Yes     Alcohol/week: 1.8 oz     Types: 3 Cans of beer per week     Comment: 1-2 times week    Drug use: No    Sexual activity: None   Lifestyle    Physical activity:     Days per week: None     Minutes per session: None    Stress: None   Relationships    Social connections:     Talks on phone: None     Gets together: None     Attends Confucianist service: None     Active member of club or organization: None     Attends meetings of clubs or organizations: None     Relationship status: None    Intimate partner violence:     Fear of current or ex partner: None     Emotionally abused: None     Physically abused: None     Forced sexual activity: None   Other Topics Concern    None   Social History Narrative    None       Review of Systems  As follows except as previously noted in HPI:  Constitutional: Negative for chills, diaphoresis, fatigue, fever and unexpected weight change. Respiratory: Negative for cough, shortness of breath and wheezing. Cardiovascular: Negative for chest pain and palpitations. Neurological: Negative for dizziness, syncope, cephalgia. GI / : negative  Musculoskeletal: see HPI       Objective:   Physical Exam   Constitutional: Oriented to person, place, and time. and appears well-developed and well-nourished. :   Head: Normocephalic and atraumatic. Eyes: EOM are normal.   Neck: Neck supple. Cardiovascular: Normal rate and regular rhythm. Pulmonary/Chest: Effort normal. No stridor. No respiratory distress, no wheezes. Abdominal:  No abnormal distension.     Neurological: Alert and oriented to person, place, and time. Skin: Skin is warm and dry. Psychiatric: Normal mood and affect.  Behavior is normal. Thought content normal.    ISHMAEL Dejesus,     6/26/19  10:52 AM

## 2019-09-25 ENCOUNTER — OFFICE VISIT (OUTPATIENT)
Dept: ORTHOPEDIC SURGERY | Age: 84
End: 2019-09-25
Payer: MEDICARE

## 2019-09-25 VITALS — WEIGHT: 154 LBS | BODY MASS INDEX: 26.29 KG/M2 | HEIGHT: 64 IN

## 2019-09-25 DIAGNOSIS — Z96.651 STATUS POST TOTAL RIGHT KNEE REPLACEMENT USING CEMENT: Primary | ICD-10-CM

## 2019-09-25 PROCEDURE — 99214 OFFICE O/P EST MOD 30 MIN: CPT | Performed by: ORTHOPAEDIC SURGERY

## 2019-09-25 NOTE — PROGRESS NOTES
Chief Complaint:   Chief Complaint   Patient presents with    Follow-up     follow up from right TKR DOS 4/9/19, patient had films today       Darien Sandoval is 5-1/2 months post right total knee replacement arthroplasty. She is doing well with the knee overall. She does get a feeling that is a little bit squishy in there and she also complains of some back problems and pulling when she is walking and some fear when she is away from home. She did not have her back evaluated for previously reported problems. Allergies; medications; past medical, surgical, family, and social history; and problem list have been reviewed today and updated as indicated in this encounter seen below. Exam: Her incision is well-healed in the right knee. Patellofemoral alignment is good. She has some medial gapping of the knee suggestive of release of the MCL to balance her knee. Functionally she is stable. Range of motion is very near 0 to 110 degrees of flexion. Gait does tend to vault over the right knee and is seems to be because she is fearful. Her knee is not unstable. She is able to exhibit a smooth gait when she decides to normalize her weightbearing in her stride on the right lower extremity. This does not cause her any discomfort and she is shows no signs of being near falling. Radiographs: Imaging of the right knee and 2 views shows a stable cemented total knee replacement arthroplasty in good alignment. ASSESSMENT:    Vale Louise was seen today for follow-up. Diagnoses and all orders for this visit:    Status post total right knee replacement using cement        PLAN: Continue exercise and work on normalizing gait. If she wants to drive she should practice in a parking lot prior to getting out in traffic. We will x-ray her right knee in about 6 and half months. Return in about 6 months (around 3/25/2020) for X-ray right knee.        Current Outpatient Medications   Medication Sig Dispense Refill    transobturator sling    LAMINECTOMY  10/25/2018    TOTAL KNEE ARTHROPLASTY Right 4/9/2019    RIGHT KNEE TOTAL ARTHROPLASTY ([facility]) performed by Rajni Sharp DO at [id number]       No Known Allergies    Social History     Socioeconomic History    Marital status:      Spouse name: None    Number of children: None    Years of education: None    Highest education level: None   Occupational History    None   Social Needs    Financial resource strain: None    Food insecurity:     Worry: None     Inability: None    Transportation needs:     Medical: None     Non-medical: None   Tobacco Use    Smoking status: Former Smoker     Packs/day: 0.50     Years: 15.00     Pack years: 7.50    Smokeless tobacco: Never Used   Substance and Sexual Activity    Alcohol use: Yes     Alcohol/week: 3.0 standard drinks     Types: 3 Cans of beer per week     Comment: 1-2 times week    Drug use: No    Sexual activity: None   Lifestyle    Physical activity:     Days per week: None     Minutes per session: None    Stress: None   Relationships    Social connections:     Talks on phone: None     Gets together: None     Attends Rastafarian service: None     Active member of club or organization: None     Attends meetings of clubs or organizations: None     Relationship status: None    Intimate partner violence:     Fear of current or ex partner: None     Emotionally abused: None     Physically abused: None     Forced sexual activity: None   Other Topics Concern    None   Social History Narrative    None       Review of Systems  As follows except as previously noted in HPI:  Constitutional: Negative for chills, diaphoresis, fatigue, fever and unexpected weight change. Respiratory: Negative for cough, shortness of breath and wheezing. Cardiovascular: Negative for chest pain and palpitations. Neurological: Negative for dizziness, syncope, cephalgia.   GI / : negative  Musculoskeletal: see HPI       Objective:

## 2020-02-18 NOTE — H&P
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, borderline    DJD (degenerative joint disease)     DJD (degenerative joint disease)     GERD (gastroesophageal reflux disease)     Hyperlipidemia     Hypertension     Neuropathy     Osteoarthritis     Vertigo, labyrinthine      Past Surgical History:        Procedure Laterality Date    CARPAL TUNNEL RELEASE Right 11/2014    CERVICAL FUSION  12/28/2015    anterior cervical discectomy and fusion    CHOLECYSTECTOMY      FOOT SURGERY      bilateral    HYSTERECTOMY      INCONTINENCE SURGERY  nov 2011    cystoscopy transobturator sling    LAMINECTOMY  10/25/2018     Social History:    TOBACCO:   reports that she has quit smoking. She has a 7.50 pack-year smoking history. She has never used smokeless tobacco.  ETOH:   reports that she drinks about 1.8 oz of alcohol per week. DRUGS:   reports that she does not use drugs. Family History:       Problem Relation Age of Onset    Cancer Mother     Emphysema Father      Medications Prior to Admission:  Medications Prior to Admission: aspirin 81 MG tablet, Take 81 mg by mouth daily  Nutritional Supplements (ENSURE ACTIVE HIGH PROTEIN PO), Take by mouth 2 times daily  Multiple Vitamins-Minerals (THERAPEUTIC MULTIVITAMIN-MINERALS) tablet, Take 1 tablet by mouth daily  Multiple Vitamins-Minerals (OCUVITE ADULT FORMULA PO), Take by mouth daily  omeprazole (PRILOSEC) 40 MG capsule, Take 40 mg by mouth daily Takes every other day  lisinopril (PRINIVIL;ZESTRIL) 5 MG tablet, Take 5 mg by mouth daily. simvastatin (ZOCOR) 20 MG tablet, Take 20 mg by mouth nightly. vitamin D (CHOLECALCIFEROL) 400 UNIT TABS tablet, Take 1,000 Units by mouth daily.     vitamin B-12 (CYANOCOBALAMIN) 1000 MCG tablet, Take 1,000 mcg by mouth daily. Naproxen Sodium 220 MG CAPS, Take 1 tablet by mouth as needed for Pain Hold 5 days  Allergies:  Patient has no known allergies.     CONSTITUTIONAL:  negative for  chills and anorexia  HEENT:  negative for  tinnitus  RESPIRATORY:  negative for  dyspnea and cyanosis  CARDIOVASCULAR:  negative for  palpitations, syncope  GASTROINTESTINAL:  negative for vomiting and hematemesis  GENITOURINARY:  negative for hematuria  ENDOCRINE:  negative for tremor  MUSCULOSKELETAL:  positive for  arthralgias, pain, joint swelling and stiff joints  NEUROLOGICAL:  negative for seizures and syncope  BEHAVIOR/PSYCH:  negative for agitated and anxiety    PHYSICAL EXAM:  BP (!) 163/67   Pulse 73   Temp 96.8 °F (36 °C) (Temporal)   Resp 16   Ht 5' 4\" (1.626 m)   Wt 159 lb (72.1 kg)   SpO2 100%   BMI 27.29 kg/m²   General appearance:  awake, alert, cooperative, no apparent distress, and appears stated age  Neurologic: neg  Lungs:  No increased work of breathing, good air exchange, clear to auscultation bilaterally, no crackles or wheezing  Heart:  Normal apical impulse, regular rate and rhythm, normal S1 and S2, no S3 or S4, and no murmur noted  Abdomen:  normal bowel sounds  Skin: warm and dry, no rash or erythema  ENT: tympanic membrane, external ear and ear canal normal bilaterally, oropharynx clear and moist with normal mucous membranes  Musculoskeletal: stiff painful knee    General Labs:  CBC:   Lab Results   Component Value Date    WBC 7.9 03/27/2019    RBC 4.19 03/27/2019    HGB 12.5 03/27/2019    HCT 39.3 03/27/2019    MCV 93.8 03/27/2019    RDW 14.9 03/27/2019     03/27/2019     CMP:    Lab Results   Component Value Date     03/27/2019    K 4.4 03/27/2019     03/27/2019    CO2 31 03/27/2019    BUN 22 03/27/2019     U/A:  No components found for: Ardeth Regulus, USPGRAV, UPH, UPROTEIN, UGLUCOSE, UKETONE, UBILI, UBLOOD, UNITRITE, UUROBIL, Jay Power    Radiology: degen knee    ASSESSMENT AND PLAN:    TKR      Electronically signed by Malka Davis DO on 4/9/2019 at 12:42 PM Spiral Flap Text: The defect edges were debeveled with a #15 scalpel blade.  Given the location of the defect, shape of the defect and the proximity to free margins a spiral flap was deemed most appropriate.  Using a sterile surgical marker, an appropriate rotation flap was drawn incorporating the defect and placing the expected incisions within the relaxed skin tension lines where possible. The area thus outlined was incised deep to adipose tissue with a #15 scalpel blade.  The skin margins were undermined to an appropriate distance in all directions utilizing iris scissors.

## 2020-04-02 ENCOUNTER — TELEPHONE (OUTPATIENT)
Dept: ORTHOPEDIC SURGERY | Age: 85
End: 2020-04-02

## 2020-05-13 ENCOUNTER — OFFICE VISIT (OUTPATIENT)
Dept: ORTHOPEDIC SURGERY | Age: 85
End: 2020-05-13
Payer: MEDICARE

## 2020-05-13 VITALS — WEIGHT: 157 LBS | HEIGHT: 64 IN | BODY MASS INDEX: 26.8 KG/M2 | TEMPERATURE: 98 F

## 2020-05-13 PROCEDURE — 99213 OFFICE O/P EST LOW 20 MIN: CPT | Performed by: ORTHOPAEDIC SURGERY

## 2020-05-13 NOTE — PROGRESS NOTES
Chief Complaint:   Chief Complaint   Patient presents with    Knee Pain     Right TKA, DOS 4/9/2019       Sam Sevilla 12 months postop right total knee replacement arthroplasty. She says the knee is doing very well and she has no problems with it. She does walk with a walker but is not because of her right knee. Allergies; medications; past medical, surgical, family, and social history; and problem list have been reviewed today and updated as indicated in this encounter seen below. Exam: Skin condition gross neurovascular function are good in the right lower extremity. The wound is well-healed and she just has a faint scar anterior midline. The knee is stable. She has stable collateral ligaments and she has good anterior and posterior stability. Range of motion is 5 to 120 degrees of flexion. There is no calf tenderness or swelling. Radiographs: Imaging of the right knee and 2 views shows a stable cemented right total knee replacement arthroplasty in good alignment. ASSESSMENT:    There are no diagnoses linked to this encounter. PLAN: Activity as tolerated and follow-up as needed. Return if symptoms worsen or fail to improve. Current Outpatient Medications   Medication Sig Dispense Refill    Nutritional Supplements (ENSURE ACTIVE HIGH PROTEIN PO) Take by mouth 2 times daily      Multiple Vitamins-Minerals (THERAPEUTIC MULTIVITAMIN-MINERALS) tablet Take 1 tablet by mouth daily      Multiple Vitamins-Minerals (OCUVITE ADULT FORMULA PO) Take by mouth daily      omeprazole (PRILOSEC) 40 MG capsule Take 40 mg by mouth daily Takes every other day      Naproxen Sodium 220 MG CAPS Take 1 tablet by mouth as needed for Pain Hold 5 days      simvastatin (ZOCOR) 20 MG tablet Take 20 mg by mouth nightly.  vitamin D (CHOLECALCIFEROL) 400 UNIT TABS tablet Take 1,000 Units by mouth daily.  vitamin B-12 (CYANOCOBALAMIN) 1000 MCG tablet Take 1,000 mcg by mouth daily.          No current facility-administered medications for this visit.         Patient Active Problem List   Diagnosis    Cervical pain (neck)    Bulging of cervical intervertebral disc without myelopathy    Cervical osteophyte    Cervical spine instability    S/P cervical spinal fusion    Arthritis of right knee    Essential hypertension    [facility]    Hyperlipidemia    Acute blood loss anemia    Postoperative anemia    Status post total right knee replacement       Past Medical History:   Diagnosis Date    Anemia     Arthritis     Blanco's esophagus 2013    COPD (chronic obstructive pulmonary disease) ([facility] [address] 2005    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, borderline    DJD (degenerative joint disease)     DJD (degenerative joint disease)     GERD (gastroesophageal reflux disease)     Hyperlipidemia     Hypertension     Neuropathy     Osteoarthritis     Vertigo, labyrinthine        Past Surgical History:   Procedure Laterality Date    CARPAL TUNNEL RELEASE Right 11/2014    CERVICAL FUSION  12/28/2015    anterior cervical discectomy and fusion    CHOLECYSTECTOMY      FOOT SURGERY      bilateral    HYSTERECTOMY      INCONTINENCE SURGERY  nov 2011    cystoscopy transobturator sling    LAMINECTOMY  10/25/2018    TOTAL KNEE ARTHROPLASTY Right 4/9/2019    RIGHT KNEE TOTAL ARTHROPLASTY ([facility]) performed by Moose Lopez DO at [address]       No Known Allergies    Social History     Socioeconomic History    Marital status:      Spouse name: None    Number of children: None    Years of education: None    Highest education level: None   Occupational History    None   Social Needs    Financial resource strain: None    Food insecurity     Worry: None     Inability: None    Transportation needs     Medical: None     Non-medical: None   Tobacco Use    Smoking status: Former Smoker     Packs/day: 0.50     Years: 15.00     Pack years: 7.50    Smokeless tobacco: Never Used   Substance and Sexual Activity    Alcohol use: Yes     Alcohol/week: 3.0 standard drinks     Types: 3 Cans of beer per week     Comment: 1-2 times week    Drug use: No    Sexual activity: None   Lifestyle    Physical activity     Days per week: None     Minutes per session: None    Stress: None   Relationships    Social connections     Talks on phone: None     Gets together: None     Attends Gnosticism service: None     Active member of club or organization: None     Attends meetings of clubs or organizations: None     Relationship status: None    Intimate partner violence     Fear of current or ex partner: None     Emotionally abused: None     Physically abused: None     Forced sexual activity: None   Other Topics Concern    None   Social History Narrative    None       Review of Systems  As follows except as previously noted in HPI:  Constitutional: Negative for chills, diaphoresis, fatigue, fever and unexpected weight change. Respiratory: Negative for cough, shortness of breath and wheezing. Cardiovascular: Negative for chest pain and palpitations. Neurological: Negative for dizziness, syncope, cephalgia. GI / : negative  Musculoskeletal: see HPI       Objective:   Physical Exam   Constitutional: Oriented to person, place, and time. and appears well-developed and well-nourished. :   Head: Normocephalic and atraumatic. Eyes: EOM are normal.   Neck: Neck supple. Cardiovascular: Normal rate and regular rhythm. Pulmonary/Chest: Effort normal. No stridor. No respiratory distress, no wheezes. Abdominal:  No abnormal distension. Neurological: Alert and oriented to person, place, and time. Skin: Skin is warm and dry. Psychiatric: Normal mood and affect.  Behavior is normal. Thought content normal.    ISHMAEL Montero DO    5/13/20  10:39 AM

## 2022-03-02 ENCOUNTER — OFFICE VISIT (OUTPATIENT)
Dept: ORTHOPEDIC SURGERY | Age: 87
End: 2022-03-02
Payer: MEDICARE

## 2022-03-02 VITALS — WEIGHT: 155 LBS | BODY MASS INDEX: 26.46 KG/M2 | HEIGHT: 64 IN

## 2022-03-02 DIAGNOSIS — M79.651 RIGHT THIGH PAIN: Primary | ICD-10-CM

## 2022-03-02 PROCEDURE — 99213 OFFICE O/P EST LOW 20 MIN: CPT | Performed by: ORTHOPAEDIC SURGERY

## 2022-03-02 RX ORDER — FLUTICASONE PROPIONATE 50 MCG
1 SPRAY, SUSPENSION (ML) NASAL DAILY
COMMUNITY

## 2022-03-02 RX ORDER — WARFARIN SODIUM 2.5 MG/1
TABLET ORAL
COMMUNITY
Start: 2022-02-28

## 2022-03-02 RX ORDER — METOPROLOL SUCCINATE 25 MG/1
TABLET, EXTENDED RELEASE ORAL
COMMUNITY
Start: 2022-02-28

## 2022-03-02 NOTE — PROGRESS NOTES
Wolf Mendoza is a 80 y.o. female, who presents   Chief Complaint   Patient presents with    Knee Pain     Right knee and leg pain starting last week. She didnt have an injury. Complains of pain posterior from buttock to ankle. HPI[de-identified] Alicia Erwin comes in with the complaints of right hip pain which she has had for some time. There is no history of injury. She indicates pain in the lateral hip area with radiation down her thigh and also occasionally across the groin area and sometimes into the leg. She had a total knee replacement years ago and is doing well with this. She has more recent history of back surgery, 2 to 3 years ago by Dr. Jhoan Medina which apparently helped her. Allergies; medications; past medical, surgical, family, and social history; and problem list have been reviewed today and updated as indicated in this encounter - see below following Ortho specifics. Musculoskeletal: Patient was seated in a wheelchair. She some difficulty getting up was able to stand and walk with a walker which she also brought with her. Her  was present as well. She complains of discomfort in the right hip and thigh area. Patient motion of the right knee was good with good stability in the arthroplasty. Right hip range of motion is essentially equal to the right and the left with a slight amount of discomfort with internal rotation. There was no palpable or audible crepitus. She had only mild tenderness in the greater trochanteric area. Radiologic Studies: Imaging of the right hip and 2 view showed good remaining hip joint space with good conformity of head and acetabulum with a small lateral acetabular osteophyte. ASSESSMENT:  Alicia Erwin was seen today for knee pain. Diagnoses and all orders for this visit:    Right thigh pain  -     XR HIP RIGHT (2-3 VIEWS);  Future     Treatment alternatives were reviewed including medical and physical therapies, injections, and surgical options, expected risks benefits and likely outcome of each were discussed in detail, questions asked and answered and understood. We discussed the symptoms as well as physical findings and imaging results. Exam does not suggest the hip arthrosis as a major cause of her discomfort. Trochanteric area discomfort is mild. There is a possibility that she is having some more problems related to her spine. PLAN: We discussed treatment options I recommended physical therapy to help improve comfort strength and stability. She was not enthusiastic about this and says that she absolutely wants no surgical treatment. We will wait till Mrs. Sheila Campos makes a decision on how she would like to proceed and I work with her accordingly.         Patient Active Problem List   Diagnosis    Cervical pain (neck)    Bulging of cervical intervertebral disc without myelopathy    Cervical osteophyte    Cervical spine instability    S/P cervical spinal fusion    Arthritis of right knee    Essential hypertension    [facility])    Hyperlipidemia    Acute blood loss anemia    Postoperative anemia    Status post total right knee replacement       Past Medical History:   Diagnosis Date    Anemia     Arthritis     Blanco's esophagus 2013    COPD (chronic obstructive pulmonary disease) ([facility] [address] 2005    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, borderline    DJD (degenerative joint disease)     DJD (degenerative joint disease)     GERD (gastroesophageal reflux disease)     Hyperlipidemia     Hypertension     Neuropathy     Osteoarthritis     Vertigo, labyrinthine        Past Surgical History:   Procedure Laterality Date    CARPAL TUNNEL RELEASE Right 11/2014    CERVICAL FUSION  12/28/2015    anterior cervical discectomy and fusion    CHOLECYSTECTOMY      FOOT SURGERY      bilateral    HYSTERECTOMY      INCONTINENCE SURGERY  nov 2011    cystoscopy transobturator sling    LAMINECTOMY  10/25/2018    TOTAL KNEE ARTHROPLASTY Right 4/9/2019    RIGHT KNEE TOTAL ARTHROPLASTY ([facility]) performed by Neno Galarza DO at [address]       Current Outpatient Medications   Medication Sig Dispense Refill    diclofenac sodium (VOLTAREN) 1 % GEL Apply topically 2 times daily      mirabegron (MYRBETRIQ) 50 MG TB24 Take 50 mg by mouth daily      fluticasone (FLONASE) 50 MCG/ACT nasal spray 1 spray by Each Nostril route daily      warfarin (COUMADIN) 2.5 MG tablet TAKE ONE TABLET BY MOUTH DAILY      metoprolol succinate (TOPROL XL) 25 MG extended release tablet TAKE ONE TABLET BY MOUTH DAILY      Multiple Vitamins-Minerals (THERAPEUTIC MULTIVITAMIN-MINERALS) tablet Take 1 tablet by mouth daily      Multiple Vitamins-Minerals (OCUVITE ADULT FORMULA PO) Take by mouth daily      omeprazole (PRILOSEC) 40 MG capsule Take 40 mg by mouth daily Takes every other day      simvastatin (ZOCOR) 20 MG tablet Take 20 mg by mouth nightly.  vitamin D (CHOLECALCIFEROL) 400 UNIT TABS tablet Take 1,000 Units by mouth daily.  vitamin B-12 (CYANOCOBALAMIN) 1000 MCG tablet Take 1,000 mcg by mouth daily. No current facility-administered medications for this visit. No Known Allergies    Social History     Socioeconomic History    Marital status:      Spouse name: None    Number of children: None    Years of education: None    Highest education level: None   Occupational History    None   Tobacco Use    Smoking status: Former Smoker     Packs/day: 0.50     Years: 15.00     Pack years: 7.50    Smokeless tobacco: Never Used   Vaping Use    Vaping Use: Never used   Substance and Sexual Activity    Alcohol use:  Yes     Alcohol/week: 3.0 standard drinks     Types: 3 Cans of beer per week     Comment: 1-2 times week    Drug use: No    Sexual activity: None   Other Topics Concern    None   Social History Narrative    None     Social Determinants of Health     Financial Resource Strain:     Difficulty of Paying Living Expenses: Not on file   Food Insecurity:     Worried About [address] [employer] in the Last Year: Not on file    Thor of Food in the Last Year: Not on file   Transportation Needs:     Lack of Transportation (Medical): Not on file    Lack of Transportation (Non-Medical): Not on file   Physical Activity:     Days of Exercise per Week: Not on file    Minutes of Exercise per Session: Not on file   Stress:     Feeling of Stress : Not on file   Social Connections:     Frequency of Communication with Friends and Family: Not on file    Frequency of Social Gatherings with Friends and Family: Not on file    Attends Sabianism Services: Not on file    Active Member of [address] [place] [address] or Organizations: Not on file    Attends Club or Organization Meetings: Not on file    Marital Status: Not on file   Intimate Partner Violence:     Fear of Current or Ex-Partner: Not on file    Emotionally Abused: Not on file    Physically Abused: Not on file    Sexually Abused: Not on file   Housing Stability:     Unable to Pay for Housing in the Last Year: Not on file    Number of [place] in the Last Year: Not on file    Unstable Housing in the Last Year: Not on file       Family History   Problem Relation Age of Onset    Cancer Mother     Emphysema Father          Review of Systems:   As follows except as previously noted in HPI:  Constitutional: Negative for chills, diaphoresis,  fever   Respiratory: Negative for cough, shortness of breath and wheezing. Cardiovascular: Negative for chest pain and palpitations. Neurological: Negative for dizziness, syncope,   GI / : abdominal pain or cramping  Musculoskeletal: see HPI       Objective:   Physical Exam   Constitutional: Oriented to person, place, and time. and appears well-developed and well-nourished. :   Head: Normocephalic and atraumatic. Neck: Neck supple. Eyes: EOM are normal.   Pulmonary/Chest: Effort normal.  No respiratory distress, no wheezes. Neurological: Alert and oriented to person  Skin: Skin is warm and dry. Judith DO Monica    3/2/22  11:40 AM    All reasonable efforts have been made to minimize the risk of errors that may occur in the use of voice recognition and other electronic means of charting.

## (undated) DEVICE — STRIP,CLOSURE,WOUND,MEDI-STRIP,1/2X4: Brand: MEDLINE

## (undated) DEVICE — READY WET SKIN SCRUB TRAY-LF: Brand: MEDLINE INDUSTRIES, INC.

## (undated) DEVICE — 3 BONE CEMENT MIXER: Brand: MIXEVAC

## (undated) DEVICE — COVER,TABLE,60X90,STERILE: Brand: MEDLINE

## (undated) DEVICE — SHEET DRAPE FULL 70X100

## (undated) DEVICE — SET MAJOR INSTR ORTHO

## (undated) DEVICE — TUBING, SUCTION, 1/4" X 10', STRAIGHT: Brand: MEDLINE

## (undated) DEVICE — MEDI-VAC YANKAUER SUCTION HANDLE: Brand: CARDINAL HEALTH

## (undated) DEVICE — SET EXTN L14IN 1ML IV L BOR NO FLTR NO STPCOCK

## (undated) DEVICE — DOUBLE BASIN SET: Brand: MEDLINE INDUSTRIES, INC.

## (undated) DEVICE — SYRINGE MED 50ML LUERLOCK TIP

## (undated) DEVICE — Z DISCONTINUED PER MEDLINE USE 2741944 DRESSING AQUACEL 12 IN SURG W9XL30CM SIL CVR WTRPRF VIR BACT BARR ANTIMIC

## (undated) DEVICE — PATIENT RETURN ELECTRODE, SINGLE-USE, CONTACT QUALITY MONITORING, ADULT, WITH 9FT CORD, FOR PATIENTS WEIGING OVER 33LBS. (15KG): Brand: MEGADYNE

## (undated) DEVICE — Z DISCONTINUED USE 2275686 GLOVE SURG SZ 8 L12IN FNGR THK13MIL WHT ISOLEX POLYISOPRENE

## (undated) DEVICE — SOLUTION IV IRRIG POUR BRL 0.9% SODIUM CHL 2F7124

## (undated) DEVICE — GOWN,SIRUS,POLYRNF,BRTHSLV,XLN/XL,20/CS: Brand: MEDLINE

## (undated) DEVICE — GRADUATE

## (undated) DEVICE — SYRINGE MED 30ML STD CLR PLAS LUERLOCK TIP N CTRL DISP

## (undated) DEVICE — BANDAGE COMPR W6INXL12FT SMOOTH FOR LIMB EXSANG ESMARCH

## (undated) DEVICE — STANDARD HYPODERMIC NEEDLE,POLYPROPYLENE HUB: Brand: MONOJECT

## (undated) DEVICE — SYRINGE IRRIG 60ML SFT PLIABLE BLB EZ TO GRP 1 HND USE W/

## (undated) DEVICE — NEEDLE FLTR 18GA L1.5IN MEM THK5UM BLNT DISP

## (undated) DEVICE — MASTISOL ADHESIVE LIQ 2/3ML

## (undated) DEVICE — TOWEL,OR,DSP,ST,BLUE,STD,6/PK,12PK/CS: Brand: MEDLINE

## (undated) DEVICE — PENCIL ES L3M BTTN SWCH HOLSTER W/ BLDE ELECTRD EDGE

## (undated) DEVICE — BANDAGE COMPR L W4INXL11YD 100% COT WVN E DBL LEN CLP CLSR

## (undated) DEVICE — 3M™ STERI-DRAPE™ U-DRAPE 1015: Brand: STERI-DRAPE™

## (undated) DEVICE — GOWN,SIRUS,FABRNF,XL,20/CS: Brand: MEDLINE

## (undated) DEVICE — STRYKER PERFORMANCE SERIES SAGITTAL BLADE: Brand: STRYKER PERFORMANCE SERIES

## (undated) DEVICE — Z DISCONTINUED NO SUB IDED GLOVE SURG BEAD CUF 8 STD PF WHT STRL TRIUMPH LT LTX

## (undated) DEVICE — SHEET SUPPORT

## (undated) DEVICE — SPONGE,LAP,12"X12",XR,ST,5/PK,40PK/CS: Brand: MEDLINE

## (undated) DEVICE — NDL CNTR 40CT FM MAG: Brand: MEDLINE INDUSTRIES, INC.

## (undated) DEVICE — GAUZE,SPONGE,4"X4",16PLY,STRL,LF,10/TRAY: Brand: MEDLINE

## (undated) DEVICE — INTENDED FOR TISSUE SEPARATION, AND OTHER PROCEDURES THAT REQUIRE A SHARP SURGICAL BLADE TO PUNCTURE OR CUT.: Brand: BARD-PARKER ® STAINLESS STEEL BLADES

## (undated) DEVICE — CANNULA IV 18GA L15IN BLNT FILL LUERLOCK HUB MJCT

## (undated) DEVICE — PACK,EXTREMITY,ORTHOMAX,5/CS: Brand: MEDLINE

## (undated) DEVICE — MARKER,SKIN,WI/RULER AND LABELS: Brand: MEDLINE

## (undated) DEVICE — PADDING,UNDERCAST,COTTON, 4"X4YD STERILE: Brand: MEDLINE

## (undated) DEVICE — PAD,ABDOMINAL,8"X10",ST,LF: Brand: MEDLINE

## (undated) DEVICE — BANDAGE COMPR W6INXL5YD SELF ADH COHESIVE CO FLX